# Patient Record
Sex: MALE | Employment: FULL TIME | URBAN - METROPOLITAN AREA
[De-identification: names, ages, dates, MRNs, and addresses within clinical notes are randomized per-mention and may not be internally consistent; named-entity substitution may affect disease eponyms.]

---

## 2017-01-10 DIAGNOSIS — F90.9 ATTENTION DEFICIT HYPERACTIVITY DISORDER (ADHD), UNSPECIFIED ADHD TYPE: Primary | ICD-10-CM

## 2017-01-10 RX ORDER — LISDEXAMFETAMINE DIMESYLATE 40 MG/1
40 CAPSULE ORAL EVERY MORNING
Qty: 30 CAPSULE | Refills: 0 | Status: SHIPPED | OUTPATIENT
Start: 2017-01-10 | End: 2017-02-13

## 2017-02-13 ENCOUNTER — TELEPHONE (OUTPATIENT)
Dept: FAMILY MEDICINE | Facility: CLINIC | Age: 27
End: 2017-02-13

## 2017-02-13 DIAGNOSIS — F90.9 ATTENTION DEFICIT HYPERACTIVITY DISORDER (ADHD), UNSPECIFIED ADHD TYPE: ICD-10-CM

## 2017-02-13 NOTE — TELEPHONE ENCOUNTER
Clinic Action Needed: Patient  requesting hard copy Rx for Vyvanse , please take to your pharmacy downstairs and call Patient  when sent or if questions.   Reason for Call:needs refill .   Routed to:Sent to PCP's nurse pool.   Nithya Hardy RN, [facility]

## 2017-02-14 RX ORDER — LISDEXAMFETAMINE DIMESYLATE 40 MG/1
40 CAPSULE ORAL EVERY MORNING
Qty: 30 CAPSULE | Refills: 0 | Status: SHIPPED | OUTPATIENT
Start: 2017-02-14 | End: 2017-03-14

## 2017-03-14 DIAGNOSIS — F90.9 ATTENTION DEFICIT HYPERACTIVITY DISORDER (ADHD), UNSPECIFIED ADHD TYPE: ICD-10-CM

## 2017-03-14 RX ORDER — LISDEXAMFETAMINE DIMESYLATE 40 MG/1
40 CAPSULE ORAL EVERY MORNING
Qty: 30 CAPSULE | Refills: 0 | Status: SHIPPED | OUTPATIENT
Start: 2017-03-14 | End: 2017-04-13

## 2017-04-13 DIAGNOSIS — F90.9 ATTENTION DEFICIT HYPERACTIVITY DISORDER (ADHD), UNSPECIFIED ADHD TYPE: ICD-10-CM

## 2017-04-13 RX ORDER — LISDEXAMFETAMINE DIMESYLATE 40 MG/1
40 CAPSULE ORAL EVERY MORNING
Qty: 30 CAPSULE | Refills: 0 | Status: SHIPPED | OUTPATIENT
Start: 2017-04-13 | End: 2017-05-11

## 2017-05-11 DIAGNOSIS — F90.9 ATTENTION DEFICIT HYPERACTIVITY DISORDER (ADHD), UNSPECIFIED ADHD TYPE: ICD-10-CM

## 2017-05-11 RX ORDER — LISDEXAMFETAMINE DIMESYLATE 40 MG/1
40 CAPSULE ORAL EVERY MORNING
Qty: 30 CAPSULE | Refills: 0 | Status: SHIPPED | OUTPATIENT
Start: 2017-05-11 | End: 2017-06-13

## 2017-06-13 DIAGNOSIS — F90.9 ATTENTION DEFICIT HYPERACTIVITY DISORDER (ADHD), UNSPECIFIED ADHD TYPE: ICD-10-CM

## 2017-06-13 RX ORDER — LISDEXAMFETAMINE DIMESYLATE 40 MG/1
40 CAPSULE ORAL EVERY MORNING
Qty: 30 CAPSULE | Refills: 0 | Status: SHIPPED | OUTPATIENT
Start: 2017-06-13 | End: 2017-07-13

## 2017-07-13 DIAGNOSIS — F90.9 ATTENTION DEFICIT HYPERACTIVITY DISORDER (ADHD), UNSPECIFIED ADHD TYPE: ICD-10-CM

## 2017-07-13 RX ORDER — LISDEXAMFETAMINE DIMESYLATE 40 MG/1
40 CAPSULE ORAL EVERY MORNING
Qty: 30 CAPSULE | Refills: 0 | Status: SHIPPED | OUTPATIENT
Start: 2017-07-13 | End: 2017-08-10

## 2017-07-13 NOTE — TELEPHONE ENCOUNTER
Rx taken to the [facility] pharmacy per pt request.  Adriana Ackerman RN

## 2017-08-10 DIAGNOSIS — F90.9 ATTENTION DEFICIT HYPERACTIVITY DISORDER (ADHD), UNSPECIFIED ADHD TYPE: ICD-10-CM

## 2017-08-10 RX ORDER — LISDEXAMFETAMINE DIMESYLATE 40 MG/1
40 CAPSULE ORAL EVERY MORNING
Qty: 30 CAPSULE | Refills: 0 | Status: SHIPPED | OUTPATIENT
Start: 2017-08-10 | End: 2017-09-14

## 2017-09-14 ENCOUNTER — OFFICE VISIT (OUTPATIENT)
Dept: INTERNAL MEDICINE | Facility: CLINIC | Age: 27
End: 2017-09-14

## 2017-09-14 VITALS
WEIGHT: 239.4 LBS | HEART RATE: 102 BPM | BODY MASS INDEX: 33.16 KG/M2 | DIASTOLIC BLOOD PRESSURE: 83 MMHG | SYSTOLIC BLOOD PRESSURE: 129 MMHG

## 2017-09-14 DIAGNOSIS — F90.9 ATTENTION DEFICIT HYPERACTIVITY DISORDER (ADHD), UNSPECIFIED ADHD TYPE: ICD-10-CM

## 2017-09-14 RX ORDER — LISDEXAMFETAMINE DIMESYLATE 40 MG/1
40 CAPSULE ORAL EVERY MORNING
Qty: 30 CAPSULE | Refills: 0 | Status: SHIPPED | OUTPATIENT
Start: 2017-09-14 | End: 2017-09-14

## 2017-09-14 RX ORDER — LISDEXAMFETAMINE DIMESYLATE 40 MG/1
40 CAPSULE ORAL EVERY MORNING
Qty: 30 CAPSULE | Refills: 0 | Status: SHIPPED | OUTPATIENT
Start: 2017-10-13 | End: 2017-12-19

## 2017-09-14 RX ORDER — LISDEXAMFETAMINE DIMESYLATE 40 MG/1
40 CAPSULE ORAL EVERY MORNING
Qty: 30 CAPSULE | Refills: 0 | Status: SHIPPED | OUTPATIENT
Start: 2017-09-15 | End: 2017-09-14

## 2017-09-14 RX ORDER — LISDEXAMFETAMINE DIMESYLATE 40 MG/1
40 CAPSULE ORAL EVERY MORNING
Qty: 30 CAPSULE | Refills: 0 | Status: SHIPPED | OUTPATIENT
Start: 2017-11-12 | End: 2017-12-19

## 2017-09-14 ASSESSMENT — PAIN SCALES - GENERAL: PAINLEVEL: NO PAIN (0)

## 2017-09-14 NOTE — PROGRESS NOTES
HPI: Mason Allen is a 27 year old male who comes in for forearm med review visit and refill of his Vyvanse He has been using a 40 mg tablets as since it was originally prescribed on 10/26/2015 by Dr. Mcpherson.this dose has been adequate for him although he thinks it's not quite as much of a stimulant as it was when he originally started taking it. He tries to take it at the same time every day so that his sleep is regulated. He thinks he is sleeping better on this medication than prior to taking it.. His work performance has been very satisfactory and he has received a promotion at his job as a .  He also wanted to tell me that  his dog was diagnosed with round worms which evidently can be transmitted to humans.  Patient states he hasn't had any symptoms and does not think that there is prominent opportunity for transmission.     Is asthma has been stable and he hasn't required use of his albuterol inhaler.    He has stopped running because he got a puppy and it needs to be walked daily. He has gained approximately 10 pounds. He plans to try to lose this by the end of the year.      Patient Active Problem List   Diagnosis     Attention deficit disorder with hyperactivity(314.01)     JONATHON (generalized anxiety disorder)     Seasonal allergic rhinitis         Current Outpatient Prescriptions   Medication Sig Dispense Refill     lisdexamfetamine (VYVANSE) 40 MG capsule Take 1 capsule (40 mg) by mouth every morning Pt must see primary for further refill. 30 capsule 0     albuterol (PROAIR HFA, PROVENTIL HFA, VENTOLIN HFA) 108 (90 BASE) MCG/ACT inhaler Inhale 2 puffs into the lungs daily as needed for shortness of breath / dyspnea or wheezing (use prior to exercise) 3 Inhaler 3     Ibuprofen (ADVIL PO) Take 200 mg by mouth as needed for moderate pain       NONFORMULARY Take 2 capsules by mouth daily           ALLERGIES: Bees    PAST MEDICAL HX: No past medical history on file.    PAST SURGICAL HX: No past  surgical history on file.    FAMILY HX:  No family history on file.    IMMUNIZATION HX:   Immunization History   Administered Date(s) Administered     DTAP (<7y) 1990, 1990, 1991, 08/11/1992, 07/19/1996     HEPA 08/20/2009     HIB 1991, 03/19/2004     HepB 07/14/2003, 08/18/2003, 03/19/2004     MMR 05/19/1992, 07/14/2003     Meningococcal (Menomune ) 03/29/2007     Poliovirus, inactivated (IPV) 1991, 1991, 08/11/1992, 07/19/1996     TDAP Vaccine (Boostrix) 07/05/2016     Tdap (Adacel,Boostrix) 07/14/2003       SOCIAL HX:   Social History     Social History Narrative    .  Works in [employer].        ROS:   CONSTITUTIONAL: no fatigue, no unexpected change in weight  SKIN: no worrisome rashes, no worrisome moles, no worrisome lesions  EYES: no acute vision problems or changes  ENT: no ear problems, no mouth problems, no throat problems  RESP: no significant cough, no shortness of breath  CV: no chest pain, no palpitations, no new or worsening peripheral edema  GI: no nausea, no vomiting, no constipation, no diarrhea  MS: no claudication, no myalgias, no joint aches  ENDOCRINE: no temperature intolerance, no skin/hair changes    OBJECTIVE:  /83  Pulse 102  Wt 108.6 kg (239 lb 6.4 oz)  BMI 33.16 kg/m2   Wt Readings from Last 1 Encounters:   09/14/17 108.6 kg (239 lb 6.4 oz)     Constitutional: no distress, comfortable, pleasant   Eyes: anicteric  Cardiovascular: regular rate and rhythm, normal S1 and S2, no murmurs, rubs or gallops, peripheral pulses full and symmetric   Respiratory:normal breath sounds   Musculoskeletal: full range of motion, no edema   Skin: no concerning lesions, no jaundice, temp normal   Neurological: normal gait,normal speech, no tremor   Psychological: appropriate mood   LYMPH: no axillary, cervical,  supraclavicular, infraclavicular or inguinal nodes.    ASSESSMENT/PLAN:  Mason was seen today for refill request.    Diagnoses and all orders  for this visit:    Attention deficit hyperactivity disorder (ADHD), unspecified ADHD type  He was given 3 one-month prescriptions for Vyvanse.  He will call clinic and speak with the nurse when he needs 3 more prescriptions.     He is to call if he has any he is to call if he has any GI issues or if he wants to submit a stool specimen to look for roundworms.      Total time spent 25 minutes.  More than 50% of the time spent with Mr. Allen on counseling / coordinating his care    Kelly ALSTON CNP

## 2017-09-14 NOTE — MR AVS SNAPSHOT
After Visit Summary   2017    Mason Allen    MRN: [id number]           Patient Information     Date Of Birth          1990        Visit Information        Provider Department      2017 10:05 AM Kelly Lal, APRN CNP [facility]        Today's Diagnoses     Attention deficit hyperactivity disorder (ADHD), unspecified ADHD type           Follow-ups after your visit        Who to contact     Please call your clinic at [phone] to:    Ask questions about your health    Make or cancel appointments    Discuss your medicines    Learn about your test results    Speak to your doctor   If you have compliments or concerns about an experience at your clinic, or if you wish to file a complaint, please contact [facility] Patient Relations at [phone] or email us at [email or web address]         Additional Information About Your Visit        MyChart Information     Kickfiret is an electronic gateway that provides easy, online access to your medical records. With [facility] [place], you can request a clinic appointment, read your test results, renew a prescription or communicate with your care team.     To sign up for Kickfiret visit the website at [email or web address]   You will be asked to enter the access code listed below, as well as some personal information. Please follow the directions to create your username and password.     Your access code is: [email or web address]  Expires: 2017  6:30 AM     Your access code will  in 90 days. If you need help or a new code, please contact your [facility] or call [phone] for assistance.        Care EveryWhere ID     This is your Care EveryWhere ID. This could be used by other organizations to access your [facility] medical records  [id number]        Your Vitals Were     Pulse BMI (Body Mass Index)                102 33.16 kg/m2           Blood Pressure from Last 3  Encounters:   09/14/17 129/83   07/05/16 149/85   04/13/16 126/69    Weight from Last 3 Encounters:   09/14/17 108.6 kg (239 lb 6.4 oz)   07/05/16 106.1 kg (234 lb)   04/13/16 107.5 kg (237 lb)              Today, you had the following     No orders found for display         Today's Medication Changes          These changes are accurate as of: 9/14/17  1:34 PM.  If you have any questions, ask your nurse or doctor.               Start taking these medicines.        Dose/Directions    * lisdexamfetamine 40 MG capsule   Commonly known as:  VYVANSE   Used for:  Attention deficit hyperactivity disorder (ADHD), unspecified ADHD type   Started by:  Kelly Lal APRN CNP        Dose:  40 mg   Start taking on:  10/13/2017   Take 1 capsule (40 mg) by mouth every morning Pt must see primary for further refill.   Quantity:  30 capsule   Refills:  0       * lisdexamfetamine 40 MG capsule   Commonly known as:  VYVANSE   Used for:  Attention deficit hyperactivity disorder (ADHD), unspecified ADHD type   Started by:  Kelly Lal APRN CNP        Dose:  40 mg   Start taking on:  11/12/2017   Take 1 capsule (40 mg) by mouth every morning Pt must see primary for further refill.   Quantity:  30 capsule   Refills:  0       * Notice:  This list has 2 medication(s) that are the same as other medications prescribed for you. Read the directions carefully, and ask your doctor or other care provider to review them with you.         Where to get your medicines      Some of these will need a paper prescription and others can be bought over the counter.  Ask your nurse if you have questions.     Bring a paper prescription for each of these medications     lisdexamfetamine 40 MG capsule    lisdexamfetamine 40 MG capsule                Primary Care Provider Office Phone # Fax #    GAMALIEL Snow [phone]       [address] [place] [address] [address]  [facility] [id number]        Equal Access to Services     ADILSON GLEZ  AH: Hadii linda cliftonmaldonadoo Soelaineali, waaxda luqadaha, qaybta kaaljudy aparicio, kimani orestesin hayaanora franksbon villavicencio kat scanlon. So [facility] [phone].    ATENCIÓN: Si mitchellla vera, tiene a beck disposición servicios gratuitos de asistencia lingüística. Llame al [phone].    We comply with applicable federal civil rights laws and [place] laws. We do not discriminate on the basis of race, color, national origin, age, disability sex, sexual orientation or gender identity.            Thank you!     Thank you for choosing [facility]  for your care. Our goal is always to provide you with excellent care. Hearing back from our patients is one way we can continue to improve our services. Please take a few minutes to complete the written survey that you may receive in the mail after your visit with us. Thank you!             Your Updated Medication List - Protect others around you: Learn how to safely use, store and throw away your medicines at www.disposemymeds.org.          This list is accurate as of: 9/14/17  1:34 PM.  Always use your most recent med list.                   Brand Name Dispense Instructions for use Diagnosis    ADVIL PO      Take 200 mg by mouth as needed for moderate pain        albuterol 108 (90 BASE) MCG/ACT Inhaler    PROAIR HFA/PROVENTIL HFA/VENTOLIN HFA    3 Inhaler    Inhale 2 puffs into the lungs daily as needed for shortness of breath / dyspnea or wheezing (use prior to exercise)    Exercise-induced asthma       * lisdexamfetamine 40 MG capsule   Start taking on:  10/13/2017    VYVANSE    30 capsule    Take 1 capsule (40 mg) by mouth every morning Pt must see primary for further refill.    Attention deficit hyperactivity disorder (ADHD), unspecified ADHD type       * lisdexamfetamine 40 MG capsule   Start taking on:  11/12/2017    VYVANSE    30 capsule    Take 1 capsule (40 mg) by mouth every morning Pt must see primary for further refill.    Attention deficit hyperactivity  disorder (ADHD), unspecified ADHD type       NONFORMULARY      Take 2 capsules by mouth daily        * Notice:  This list has 2 medication(s) that are the same as other medications prescribed for you. Read the directions carefully, and ask your doctor or other care provider to review them with you.

## 2017-09-14 NOTE — NURSING NOTE
Chief Complaint   Patient presents with     Refill Request     Patient here for a medication refill request.     Swati Murray LPN at 10:03 AM on 9/14/2017.

## 2017-12-19 DIAGNOSIS — F90.9 ATTENTION DEFICIT HYPERACTIVITY DISORDER (ADHD), UNSPECIFIED ADHD TYPE: ICD-10-CM

## 2017-12-19 RX ORDER — LISDEXAMFETAMINE DIMESYLATE 40 MG/1
40 CAPSULE ORAL EVERY MORNING
Qty: 30 CAPSULE | Refills: 0 | Status: SHIPPED | OUTPATIENT
Start: 2017-12-19 | End: 2017-12-19

## 2017-12-19 RX ORDER — LISDEXAMFETAMINE DIMESYLATE 40 MG/1
40 CAPSULE ORAL EVERY MORNING
Qty: 30 CAPSULE | Refills: 0 | Status: SHIPPED | OUTPATIENT
Start: 2019-01-19 | End: 2018-04-26

## 2017-12-19 RX ORDER — LISDEXAMFETAMINE DIMESYLATE 40 MG/1
40 CAPSULE ORAL EVERY MORNING
Qty: 30 CAPSULE | Refills: 0 | Status: SHIPPED | OUTPATIENT
Start: 2019-02-19 | End: 2017-12-19

## 2017-12-19 NOTE — TELEPHONE ENCOUNTER
Rx for the month of December, January, and February mailed to pt's home address.  Next refill is due on 3/19/17  Message left for pt.

## 2018-04-25 DIAGNOSIS — F90.9 ATTENTION DEFICIT HYPERACTIVITY DISORDER (ADHD), UNSPECIFIED ADHD TYPE: ICD-10-CM

## 2018-04-25 NOTE — TELEPHONE ENCOUNTER
Patient calling for a refill of the following medication:    lisdexamfetamine (VYVANSE) 40 MG capsule    Please call him when approved

## 2018-04-26 RX ORDER — LISDEXAMFETAMINE DIMESYLATE 40 MG/1
40 CAPSULE ORAL EVERY MORNING
Qty: 30 CAPSULE | Refills: 0 | Status: SHIPPED | OUTPATIENT
Start: 2019-01-19 | End: 2018-04-27

## 2018-04-27 RX ORDER — LISDEXAMFETAMINE DIMESYLATE 40 MG/1
40 CAPSULE ORAL EVERY MORNING
Qty: 30 CAPSULE | Refills: 0
Start: 2018-04-26 | End: 2018-06-07

## 2018-06-06 DIAGNOSIS — F90.9 ATTENTION DEFICIT HYPERACTIVITY DISORDER (ADHD), UNSPECIFIED ADHD TYPE: ICD-10-CM

## 2018-06-06 NOTE — TELEPHONE ENCOUNTER
[facility] Call Center    Phone Message    May a detailed message be left on voicemail: yes    Reason for Call: Medication Refill Request    Has the patient contacted the pharmacy for the refill? Yes   Name of medication being requested: lisdexamfetamine (VYVANSE) 40 MG capsule  Provider who prescribed the medication: Kelly Lal  Pharmacy: N/A = controlled substance  Date medication is needed: asap - Pt is almost out - please call Pt when medication Rx paper ready to be picked up in the lock box - Thanks!    Action Taken: Message routed to:  [facility] Primary Care

## 2018-06-06 NOTE — TELEPHONE ENCOUNTER
[facility] Call Center    Phone Message    May a detailed message be left on voicemail: yes    Reason for Call: Other: Re-routing to P [facility] MED REFILLS TEAM     Action Taken: Message routed to:  [facility]): P [facility] MED REFILLS TEAM

## 2018-06-07 DIAGNOSIS — F90.9 ATTENTION DEFICIT HYPERACTIVITY DISORDER (ADHD), UNSPECIFIED ADHD TYPE: ICD-10-CM

## 2018-06-07 RX ORDER — LISDEXAMFETAMINE DIMESYLATE 40 MG/1
40 CAPSULE ORAL EVERY MORNING
Qty: 30 CAPSULE | Refills: 0 | Status: CANCELLED | OUTPATIENT
Start: 2018-06-07

## 2018-06-11 NOTE — TELEPHONE ENCOUNTER
[facility] Call Center    Phone Message    May a detailed message be left on voicemail: yes    Reason for Call: Other: PT following up on his Rx.  Please call him with the status.     Action Taken: Message routed to:  Other: Primary Care

## 2018-06-12 RX ORDER — LISDEXAMFETAMINE DIMESYLATE 40 MG/1
40 CAPSULE ORAL EVERY MORNING
Qty: 30 CAPSULE | Refills: 0 | Status: SHIPPED | OUTPATIENT
Start: 2018-06-12 | End: 2018-07-31

## 2018-07-27 DIAGNOSIS — F90.9 ATTENTION DEFICIT HYPERACTIVITY DISORDER (ADHD), UNSPECIFIED ADHD TYPE: ICD-10-CM

## 2018-07-27 NOTE — TELEPHONE ENCOUNTER
Controlled substance refill takes 5-7 business days.    Will send it to Kelly when she is back to the clinic on 7/31.

## 2018-07-27 NOTE — TELEPHONE ENCOUNTER
Health Call Center    Phone Message    May a detailed message be left on voicemail: yes    Reason for Call: Medication Refill Request    Has the patient contacted the pharmacy for the refill? Yes   Name of medication being requested: lisdexamfetamine (VYVANSE) 40 MG capsule  Provider who prescribed the medication: Kelly Lal  Pharmacy: Printed  Date medication is needed: Soon as possible - Pt stated he get 3 scripts mailed at a time and 2 are post-dated      Action Taken: Message routed to:  [facility]

## 2018-07-31 RX ORDER — LISDEXAMFETAMINE DIMESYLATE 40 MG/1
40 CAPSULE ORAL EVERY MORNING
Qty: 30 CAPSULE | Refills: 0 | Status: SHIPPED | OUTPATIENT
Start: 2018-07-31 | End: 2018-08-01

## 2018-08-01 RX ORDER — LISDEXAMFETAMINE DIMESYLATE 40 MG/1
40 CAPSULE ORAL EVERY MORNING
Qty: 30 CAPSULE | Refills: 0 | Status: SHIPPED | OUTPATIENT
Start: 2018-09-29 | End: 2020-03-27

## 2018-08-01 RX ORDER — LISDEXAMFETAMINE DIMESYLATE 40 MG/1
40 CAPSULE ORAL EVERY MORNING
Qty: 30 CAPSULE | Refills: 0 | Status: SHIPPED | OUTPATIENT
Start: 2018-08-30 | End: 2018-08-01

## 2019-08-27 ENCOUNTER — OFFICE VISIT (OUTPATIENT)
Dept: URGENT CARE | Facility: URGENT CARE | Age: 29
End: 2019-08-27
Payer: COMMERCIAL

## 2019-08-27 ENCOUNTER — NURSE TRIAGE (OUTPATIENT)
Dept: FAMILY MEDICINE | Facility: CLINIC | Age: 29
End: 2019-08-27

## 2019-08-27 VITALS
HEIGHT: 70 IN | WEIGHT: 251.6 LBS | BODY MASS INDEX: 36.02 KG/M2 | OXYGEN SATURATION: 99 % | HEART RATE: 87 BPM | DIASTOLIC BLOOD PRESSURE: 82 MMHG | TEMPERATURE: 98.7 F | SYSTOLIC BLOOD PRESSURE: 126 MMHG

## 2019-08-27 DIAGNOSIS — R10.13 ABDOMINAL PAIN, EPIGASTRIC: Primary | ICD-10-CM

## 2019-08-27 DIAGNOSIS — R10.11 RUQ ABDOMINAL PAIN: ICD-10-CM

## 2019-08-27 DIAGNOSIS — R19.7 DIARRHEA, UNSPECIFIED TYPE: ICD-10-CM

## 2019-08-27 DIAGNOSIS — R79.89 LFT ELEVATION: ICD-10-CM

## 2019-08-27 DIAGNOSIS — K92.1 BLOOD IN STOOL: ICD-10-CM

## 2019-08-27 LAB
ALBUMIN SERPL-MCNC: 4.3 G/DL (ref 3.4–5)
ALP SERPL-CCNC: 66 U/L (ref 40–150)
ALT SERPL W P-5'-P-CCNC: 131 U/L (ref 0–70)
AMYLASE SERPL-CCNC: 20 U/L (ref 30–110)
ANION GAP SERPL CALCULATED.3IONS-SCNC: 5 MMOL/L (ref 3–14)
AST SERPL W P-5'-P-CCNC: 46 U/L (ref 0–45)
BASOPHILS # BLD AUTO: 0 10E9/L (ref 0–0.2)
BASOPHILS NFR BLD AUTO: 0.6 %
BILIRUB SERPL-MCNC: 0.6 MG/DL (ref 0.2–1.3)
BUN SERPL-MCNC: 8 MG/DL (ref 7–30)
CALCIUM SERPL-MCNC: 9.3 MG/DL (ref 8.5–10.1)
CHLORIDE SERPL-SCNC: 105 MMOL/L (ref 94–109)
CO2 SERPL-SCNC: 30 MMOL/L (ref 20–32)
CREAT SERPL-MCNC: 0.86 MG/DL (ref 0.66–1.25)
DIFFERENTIAL METHOD BLD: NORMAL
EOSINOPHIL # BLD AUTO: 0.1 10E9/L (ref 0–0.7)
EOSINOPHIL NFR BLD AUTO: 1.9 %
ERYTHROCYTE [DISTWIDTH] IN BLOOD BY AUTOMATED COUNT: 12.6 % (ref 10–15)
ERYTHROCYTE [SEDIMENTATION RATE] IN BLOOD BY WESTERGREN METHOD: 5 MM/H (ref 0–15)
GFR SERPL CREATININE-BSD FRML MDRD: >90 ML/MIN/{1.73_M2}
GLUCOSE SERPL-MCNC: 97 MG/DL (ref 70–99)
HCT VFR BLD AUTO: 43.9 % (ref 40–53)
HGB BLD-MCNC: 14.7 G/DL (ref 13.3–17.7)
LIPASE SERPL-CCNC: 106 U/L (ref 73–393)
LYMPHOCYTES # BLD AUTO: 2.2 10E9/L (ref 0.8–5.3)
LYMPHOCYTES NFR BLD AUTO: 34.3 %
MCH RBC QN AUTO: 28.8 PG (ref 26.5–33)
MCHC RBC AUTO-ENTMCNC: 33.5 G/DL (ref 31.5–36.5)
MCV RBC AUTO: 86 FL (ref 78–100)
MONOCYTES # BLD AUTO: 0.6 10E9/L (ref 0–1.3)
MONOCYTES NFR BLD AUTO: 10 %
NEUTROPHILS # BLD AUTO: 3.4 10E9/L (ref 1.6–8.3)
NEUTROPHILS NFR BLD AUTO: 53.2 %
PLATELET # BLD AUTO: 256 10E9/L (ref 150–450)
POTASSIUM SERPL-SCNC: 4.4 MMOL/L (ref 3.4–5.3)
PROT SERPL-MCNC: 7.9 G/DL (ref 6.8–8.8)
RBC # BLD AUTO: 5.11 10E12/L (ref 4.4–5.9)
SODIUM SERPL-SCNC: 140 MMOL/L (ref 133–144)
WBC # BLD AUTO: 6.4 10E9/L (ref 4–11)

## 2019-08-27 PROCEDURE — 83690 ASSAY OF LIPASE: CPT | Performed by: FAMILY MEDICINE

## 2019-08-27 PROCEDURE — 99214 OFFICE O/P EST MOD 30 MIN: CPT | Performed by: FAMILY MEDICINE

## 2019-08-27 PROCEDURE — 85025 COMPLETE CBC W/AUTO DIFF WBC: CPT | Performed by: FAMILY MEDICINE

## 2019-08-27 PROCEDURE — 36415 COLL VENOUS BLD VENIPUNCTURE: CPT | Performed by: FAMILY MEDICINE

## 2019-08-27 PROCEDURE — 80053 COMPREHEN METABOLIC PANEL: CPT | Performed by: FAMILY MEDICINE

## 2019-08-27 PROCEDURE — 82150 ASSAY OF AMYLASE: CPT | Performed by: FAMILY MEDICINE

## 2019-08-27 PROCEDURE — 85652 RBC SED RATE AUTOMATED: CPT | Performed by: FAMILY MEDICINE

## 2019-08-27 ASSESSMENT — MIFFLIN-ST. JEOR: SCORE: 2117.5

## 2019-08-27 NOTE — TELEPHONE ENCOUNTER
"Triaged per Protocol and provided advise. Pt plans to follow advise, will be evaluated in UC.     Annette LOPEZ RN      Reason for Disposition    MODERATE rectal bleeding (small blood clots, passing blood without stool, or toilet water turns red) more than once a day    Bloody, black, or tarry bowel movements    Additional Information    Negative: Passed out (i.e., fainted, collapsed and was not responding)    Negative: Shock suspected (e.g., cold/pale/clammy skin, too weak to stand, low BP, rapid pulse)    Negative: Vomiting red blood or black (coffee ground) material    Negative: Sounds like a life-threatening emergency to the triager    Negative: Diarrhea is the main symptom    Negative: Rectal symptoms    Negative: SEVERE abdominal pain (e.g., excruciating)    Negative: Constant abdominal pain lasting > 2 hours    MODERATE rectal bleeding (small blood clots, passing blood without stool, or toilet water turns red)    Negative: Taking Coumadin (warfarin) or other strong blood thinner, or known bleeding disorder (e.g., thrombocytopenia)    Negative: Colonoscopy in past 72 hours    Negative: Known cirrhosis of the liver (or history of liver failure or ascites)    Patient wants to be seen    Negative: High-risk adult (e.g., prior surgery on aorta, abdominal aortic aneurysm)    Negative: Rectal foreign body (inserted or swallowed)    Negative: Pale skin (pallor) of new onset or worsening    Negative: Patient sounds very sick or weak to the triager    Negative: MILD rectal bleeding (more than just a few drops or streaks)    Negative: Cancer of rectum or intestines (colon)    Negative: Radiation therapy to lower abdomen or pelvis    Negative: Normal formed BM with a few streaks or drops of blood on surface of BM    Negative: Rectal bleeding is minimal (e.g., blood just on toilet paper, a few drops in toilet bowl)    Answer Assessment - Initial Assessment Questions  1. APPEARANCE of BLOOD: \"What color is it?\" \"Is it passed " "separately, on the surface of the stool, or mixed in with the stool?\"     Toilet was red cool aide color     Water of the toilet was colored       2. AMOUNT: \"How much blood was passed?\"         Unsure     3. FREQUENCY: \"How many times has blood been passed with the stools?\"         Twice     4. ONSET: \"When was the blood first seen in the stools?\" (Days or weeks)            5. DIARRHEA: \"Is there also some diarrhea?\" If so, ask: \"How many diarrhea stools were passed in past 24 hours?\"         Yes, consistently for 1 month or more     6. CONSTIPATION: \"Do you have constipation?\" If so, \"How bad is it?\"         Denies    7. RECURRENT SYMPTOMS: \"Have you had blood in your stools before?\" If so, ask: \"When was the last time?\" and \"What happened that time?\"     Once- 6 months ago--very light colored, \"paniced and then calmed down\"     8. BLOOD THINNERS: \"Do you take any blood thinners?\" (e.g., Coumadin/warfarin, Pradaxa/dabigatran, aspirin)      Denies     9. OTHER SYMPTOMS: \"Do you have any other symptoms?\"  (e.g., abdominal pain, vomiting, dizziness, fever)       Golfing on , could not finished d/t lightheadedness    Abdominal Pain  Onset: Saturday  Location: right above belly button, and under right ribcage   Aggravatin hours after eating      Nausea  Onset:   Aggravating: Rich, fatty foods     Nausea, no vomiting   Denies fever     Denies SOB    Protocols used: RECTAL BLEEDING-A-OH      "

## 2019-08-27 NOTE — TELEPHONE ENCOUNTER
Reason for call:  Patient reporting a symptom    Symptom or request: Blood in stool, abdominal pain (especially after eating)    Duration (how long have symptoms been present): blood in stool has happened twice, abdominal pain for a week    Have you been treated for this before? No    Additional comments: transferred to triage    Phone Number patient can be reached at:  Home number on file [phone] (home)    Best Time:  any    Can we leave a detailed message on this number:  YES    Call taken on 8/27/2019 at 9:03 AM by Lisa Ortez

## 2019-08-27 NOTE — PROGRESS NOTES
"SUBJECTIVE  HPI: Mason Allen is a 29 year old male  who presents with the CC of abdominal/pelvic pain and diarrhea with occasional blood in stool.   Pain is located in the epigastric and RUQ area, with radiation to None    The pain is characterized as cramping.    Pain has been present for 1 month(s) and is fluctuating.     EXACERBATING FACTORS: 2 hrs after eating   RELIEVING FACTORS: NEGATIVE.    ASSOCIATED SX: none.     No past medical history on file.    No past surgical history on file.    No family history on file.    Social History     Tobacco Use     Smoking status: Never Smoker     Smokeless tobacco: Never Used   Substance Use Topics     Alcohol use: Not on file       EXAMINATION:  /82   Pulse 87   Temp 98.7  F (37.1  C) (Oral)   Ht 1.778 m (5' 10\")   Wt 114.1 kg (251 lb 9.6 oz)   SpO2 99%   BMI 36.10 kg/m  GENERAL APPEARANCE: healthy, alert and no distress  ABDOMEN: soft, normal bowel sounds, tenderness mild epigastric and RUQ, no guarding/ridigity rebound      ICD-10-CM    1. Abdominal pain, epigastric R10.13 CBC with platelets differential     Comprehensive metabolic panel     Lipase     Amylase     Erythrocyte sedimentation rate auto     Enteric Bacteria and Virus Panel by NEY Stool     Ova and Parasite Exam Routine     omeprazole (PRILOSEC) 20 MG DR capsule     GASTROENTEROLOGY ADULT REF CONSULT ONLY   2. RUQ abdominal pain R10.11 CBC with platelets differential     Comprehensive metabolic panel     Lipase     Amylase     Erythrocyte sedimentation rate auto     Enteric Bacteria and Virus Panel by NEY Stool     Ova and Parasite Exam Routine     GASTROENTEROLOGY ADULT REF CONSULT ONLY   3. Diarrhea, unspecified type R19.7 CBC with platelets differential     Comprehensive metabolic panel     Lipase     Amylase     Erythrocyte sedimentation rate auto     Enteric Bacteria and Virus Panel by NEY Stool     Ova and Parasite Exam Routine     GASTROENTEROLOGY ADULT REF CONSULT ONLY   4. Blood in stool " K92.1 CBC with platelets differential     Comprehensive metabolic panel     Lipase     Amylase     Erythrocyte sedimentation rate auto     Enteric Bacteria and Virus Panel by NEY Stool     Ova and Parasite Exam Routine     GASTROENTEROLOGY ADULT REF CONSULT ONLY   5. LFT elevation R94.5      F/U PCP/IM/FP, ED if worse

## 2019-08-28 ENCOUNTER — TELEPHONE (OUTPATIENT)
Dept: GASTROENTEROLOGY | Facility: CLINIC | Age: 29
End: 2019-08-28

## 2019-08-28 ENCOUNTER — HOSPITAL ENCOUNTER (OUTPATIENT)
Dept: LAB | Facility: CLINIC | Age: 29
Discharge: HOME | End: 2019-08-28
Admitting: FAMILY MEDICINE
Payer: COMMERCIAL

## 2019-08-28 DIAGNOSIS — R10.11 RUQ ABDOMINAL PAIN: ICD-10-CM

## 2019-08-28 DIAGNOSIS — R19.7 DIARRHEA, UNSPECIFIED TYPE: ICD-10-CM

## 2019-08-28 DIAGNOSIS — R10.13 ABDOMINAL PAIN, EPIGASTRIC: ICD-10-CM

## 2019-08-28 DIAGNOSIS — K92.1 BLOOD IN STOOL: ICD-10-CM

## 2019-08-28 PROCEDURE — 87177 OVA AND PARASITES SMEARS: CPT | Performed by: FAMILY MEDICINE

## 2019-08-28 PROCEDURE — 87506 IADNA-DNA/RNA PROBE TQ 6-11: CPT | Performed by: FAMILY MEDICINE

## 2019-08-28 PROCEDURE — 87209 SMEAR COMPLEX STAIN: CPT | Performed by: FAMILY MEDICINE

## 2019-08-28 NOTE — TELEPHONE ENCOUNTER
Health Call Center    Phone Message    May a detailed message be left on voicemail: yes    Reason for Call: Other: Pt has a referral in [facility] per Pt's urgent care visit on 8/27/2019. Pt noted that he needs to be seen soon for the follow up appt. Clinic notes and referral are in Pt's chart. Please follow up with Pt to discuss scheduling options.      Action Taken: Message routed to:  [facility] GI Med

## 2019-08-28 NOTE — TELEPHONE ENCOUNTER
Pt seen in urgent care. Pt was referred to [facility] and also [facility]. Pt has an appt next week with [facility]. Unable to offer pt an appt next week due to high demand and number of providers.

## 2019-08-29 LAB
C COLI+JEJUNI+LARI FUSA STL QL NAA+PROBE: NOT DETECTED
EC STX1 GENE STL QL NAA+PROBE: NOT DETECTED
EC STX2 GENE STL QL NAA+PROBE: NOT DETECTED
ENTERIC PATHOGEN COMMENT: NORMAL
NOROV GI+II ORF1-ORF2 JNC STL QL NAA+PR: NOT DETECTED
O+P STL MICRO: NORMAL
O+P STL MICRO: NORMAL
RVA NSP5 STL QL NAA+PROBE: NOT DETECTED
SALMONELLA SP RPOD STL QL NAA+PROBE: NOT DETECTED
SHIGELLA SP+EIEC IPAH STL QL NAA+PROBE: NOT DETECTED
SPECIMEN SOURCE: NORMAL
V CHOL+PARA RFBL+TRKH+TNAA STL QL NAA+PR: NOT DETECTED
Y ENTERO RECN STL QL NAA+PROBE: NOT DETECTED

## 2019-09-04 ENCOUNTER — MEDICAL CORRESPONDENCE (OUTPATIENT)
Dept: HEALTH INFORMATION MANAGEMENT | Facility: CLINIC | Age: 29
End: 2019-09-04

## 2019-09-18 DIAGNOSIS — R19.7 DIARRHEA: ICD-10-CM

## 2019-09-18 DIAGNOSIS — R10.13 EPIGASTRIC PAIN: Primary | ICD-10-CM

## 2019-09-18 DIAGNOSIS — K92.1 MELENA: ICD-10-CM

## 2019-09-18 DIAGNOSIS — K92.1 HEMATOCHEZIA: ICD-10-CM

## 2019-09-19 ENCOUNTER — HOSPITAL ENCOUNTER (OUTPATIENT)
Dept: LAB | Facility: CLINIC | Age: 29
End: 2019-09-19
Attending: INTERNAL MEDICINE
Payer: COMMERCIAL

## 2019-09-19 ENCOUNTER — HOSPITAL ENCOUNTER (OUTPATIENT)
Dept: ULTRASOUND IMAGING | Facility: CLINIC | Age: 29
Discharge: HOME | End: 2019-09-19
Attending: INTERNAL MEDICINE | Admitting: INTERNAL MEDICINE
Payer: COMMERCIAL

## 2019-09-19 DIAGNOSIS — R10.13 EPIGASTRIC PAIN: ICD-10-CM

## 2019-09-19 DIAGNOSIS — K92.1 HEMATOCHEZIA: ICD-10-CM

## 2019-09-19 DIAGNOSIS — R19.7 DIARRHEA: ICD-10-CM

## 2019-09-19 DIAGNOSIS — K92.1 MELENA: ICD-10-CM

## 2019-09-19 LAB
ALBUMIN SERPL-MCNC: 4.4 G/DL (ref 3.4–5)
ALP SERPL-CCNC: 61 U/L (ref 40–150)
ALT SERPL W P-5'-P-CCNC: 193 U/L (ref 0–70)
ANION GAP SERPL CALCULATED.3IONS-SCNC: 5 MMOL/L (ref 3–14)
AST SERPL W P-5'-P-CCNC: 80 U/L (ref 0–45)
BASOPHILS # BLD AUTO: 0 10E9/L (ref 0–0.2)
BASOPHILS NFR BLD AUTO: 0.5 %
BILIRUB SERPL-MCNC: 0.9 MG/DL (ref 0.2–1.3)
BUN SERPL-MCNC: 10 MG/DL (ref 7–30)
CALCIUM SERPL-MCNC: 9.1 MG/DL (ref 8.5–10.1)
CHLORIDE SERPL-SCNC: 105 MMOL/L (ref 94–109)
CO2 SERPL-SCNC: 26 MMOL/L (ref 20–32)
CREAT SERPL-MCNC: 0.76 MG/DL (ref 0.66–1.25)
CRP SERPL HS-MCNC: 2.7 MG/L
DIFFERENTIAL METHOD BLD: NORMAL
EOSINOPHIL # BLD AUTO: 0.1 10E9/L (ref 0–0.7)
EOSINOPHIL NFR BLD AUTO: 2 %
ERYTHROCYTE [DISTWIDTH] IN BLOOD BY AUTOMATED COUNT: 12.6 % (ref 10–15)
FERRITIN SERPL-MCNC: 241 NG/ML (ref 26–388)
GFR SERPL CREATININE-BSD FRML MDRD: >90 ML/MIN/{1.73_M2}
GLUCOSE SERPL-MCNC: 88 MG/DL (ref 70–99)
HCT VFR BLD AUTO: 44.4 % (ref 40–53)
HGB BLD-MCNC: 14.7 G/DL (ref 13.3–17.7)
IMM GRANULOCYTES # BLD: 0 10E9/L (ref 0–0.4)
IMM GRANULOCYTES NFR BLD: 0.4 %
IRON SATN MFR SERPL: 22 % (ref 15–46)
IRON SERPL-MCNC: 91 UG/DL (ref 35–180)
LYMPHOCYTES # BLD AUTO: 1.7 10E9/L (ref 0.8–5.3)
LYMPHOCYTES NFR BLD AUTO: 31.1 %
MCH RBC QN AUTO: 28.9 PG (ref 26.5–33)
MCHC RBC AUTO-ENTMCNC: 33.1 G/DL (ref 31.5–36.5)
MCV RBC AUTO: 87 FL (ref 78–100)
MONOCYTES # BLD AUTO: 0.5 10E9/L (ref 0–1.3)
MONOCYTES NFR BLD AUTO: 9.5 %
NEUTROPHILS # BLD AUTO: 3.1 10E9/L (ref 1.6–8.3)
NEUTROPHILS NFR BLD AUTO: 56.5 %
NRBC # BLD AUTO: 0 10*3/UL
NRBC BLD AUTO-RTO: 0 /100
PLATELET # BLD AUTO: 208 10E9/L (ref 150–450)
POTASSIUM SERPL-SCNC: 3.8 MMOL/L (ref 3.4–5.3)
PROT SERPL-MCNC: 7.8 G/DL (ref 6.8–8.8)
RBC # BLD AUTO: 5.09 10E12/L (ref 4.4–5.9)
SODIUM SERPL-SCNC: 136 MMOL/L (ref 133–144)
TIBC SERPL-MCNC: 405 UG/DL (ref 240–430)
WBC # BLD AUTO: 5.5 10E9/L (ref 4–11)

## 2019-09-19 PROCEDURE — 36415 COLL VENOUS BLD VENIPUNCTURE: CPT | Performed by: INTERNAL MEDICINE

## 2019-09-19 PROCEDURE — 76700 US EXAM ABDOM COMPLETE: CPT

## 2019-09-19 PROCEDURE — 83540 ASSAY OF IRON: CPT | Performed by: INTERNAL MEDICINE

## 2019-09-19 PROCEDURE — 83516 IMMUNOASSAY NONANTIBODY: CPT | Performed by: INTERNAL MEDICINE

## 2019-09-19 PROCEDURE — 86256 FLUORESCENT ANTIBODY TITER: CPT | Performed by: INTERNAL MEDICINE

## 2019-09-19 PROCEDURE — 82784 ASSAY IGA/IGD/IGG/IGM EACH: CPT | Performed by: INTERNAL MEDICINE

## 2019-09-19 PROCEDURE — 83550 IRON BINDING TEST: CPT | Performed by: INTERNAL MEDICINE

## 2019-09-19 PROCEDURE — 82728 ASSAY OF FERRITIN: CPT | Performed by: INTERNAL MEDICINE

## 2019-09-19 PROCEDURE — 86706 HEP B SURFACE ANTIBODY: CPT | Performed by: INTERNAL MEDICINE

## 2019-09-19 PROCEDURE — 83516 IMMUNOASSAY NONANTIBODY: CPT | Mod: 91 | Performed by: INTERNAL MEDICINE

## 2019-09-19 PROCEDURE — 85025 COMPLETE CBC W/AUTO DIFF WBC: CPT | Performed by: INTERNAL MEDICINE

## 2019-09-19 PROCEDURE — 80053 COMPREHEN METABOLIC PANEL: CPT | Performed by: INTERNAL MEDICINE

## 2019-09-19 PROCEDURE — 87340 HEPATITIS B SURFACE AG IA: CPT | Performed by: INTERNAL MEDICINE

## 2019-09-19 PROCEDURE — 86141 C-REACTIVE PROTEIN HS: CPT | Performed by: INTERNAL MEDICINE

## 2019-09-19 PROCEDURE — 86803 HEPATITIS C AB TEST: CPT | Performed by: INTERNAL MEDICINE

## 2019-09-19 PROCEDURE — 86038 ANTINUCLEAR ANTIBODIES: CPT | Performed by: INTERNAL MEDICINE

## 2019-09-20 LAB
ANA SER QL IF: NEGATIVE
GLIADIN IGA SER-ACNC: <1 U/ML
GLIADIN IGG SER-ACNC: <1 U/ML
HBV SURFACE AB SERPL IA-ACNC: 930.15 M[IU]/ML
HBV SURFACE AG SERPL QL IA: NONREACTIVE
HCV AB SERPL QL IA: NONREACTIVE
IGA SERPL-MCNC: 92 MG/DL (ref 70–380)
SMA IGG SER-ACNC: 3 UNITS (ref 0–19)
TTG IGA SER-ACNC: <1 U/ML
TTG IGG SER-ACNC: <1 U/ML

## 2019-09-21 LAB — ENDOMYSIUM IGA TITR SER IF: NORMAL {TITER}

## 2019-10-03 ENCOUNTER — OFFICE VISIT (OUTPATIENT)
Dept: FAMILY MEDICINE | Facility: CLINIC | Age: 29
End: 2019-10-03
Payer: COMMERCIAL

## 2019-10-03 VITALS
HEART RATE: 96 BPM | HEIGHT: 70 IN | OXYGEN SATURATION: 98 % | TEMPERATURE: 97.9 F | SYSTOLIC BLOOD PRESSURE: 119 MMHG | DIASTOLIC BLOOD PRESSURE: 81 MMHG | BODY MASS INDEX: 35.5 KG/M2 | WEIGHT: 248 LBS

## 2019-10-03 DIAGNOSIS — Z83.79 FAMILY HISTORY OF CROHN'S DISEASE: ICD-10-CM

## 2019-10-03 DIAGNOSIS — R06.83 SNORING: ICD-10-CM

## 2019-10-03 DIAGNOSIS — Z76.89 ESTABLISHING CARE WITH NEW DOCTOR, ENCOUNTER FOR: ICD-10-CM

## 2019-10-03 DIAGNOSIS — G47.33 OSA (OBSTRUCTIVE SLEEP APNEA): ICD-10-CM

## 2019-10-03 DIAGNOSIS — K92.1 BLOOD IN STOOL: Primary | ICD-10-CM

## 2019-10-03 PROCEDURE — 99214 OFFICE O/P EST MOD 30 MIN: CPT | Performed by: INTERNAL MEDICINE

## 2019-10-03 ASSESSMENT — MIFFLIN-ST. JEOR: SCORE: 2101.17

## 2019-10-17 ENCOUNTER — HOSPITAL ENCOUNTER (OUTPATIENT)
Facility: CLINIC | Age: 29
Discharge: HOME | End: 2019-10-17
Attending: INTERNAL MEDICINE | Admitting: INTERNAL MEDICINE
Payer: COMMERCIAL

## 2019-10-17 VITALS
SYSTOLIC BLOOD PRESSURE: 117 MMHG | DIASTOLIC BLOOD PRESSURE: 59 MMHG | OXYGEN SATURATION: 94 % | RESPIRATION RATE: 14 BRPM | HEART RATE: 87 BPM

## 2019-10-17 LAB
COLONOSCOPY: NORMAL
UPPER GI ENDOSCOPY: NORMAL

## 2019-10-17 PROCEDURE — 43239 EGD BIOPSY SINGLE/MULTIPLE: CPT | Performed by: INTERNAL MEDICINE

## 2019-10-17 PROCEDURE — 45380 COLONOSCOPY AND BIOPSY: CPT | Performed by: INTERNAL MEDICINE

## 2019-10-17 PROCEDURE — 25000128 H RX IP 250 OP 636: Performed by: INTERNAL MEDICINE

## 2019-10-17 PROCEDURE — 88305 TISSUE EXAM BY PATHOLOGIST: CPT | Performed by: INTERNAL MEDICINE

## 2019-10-17 PROCEDURE — 88305 TISSUE EXAM BY PATHOLOGIST: CPT | Mod: 26,59 | Performed by: INTERNAL MEDICINE

## 2019-10-17 PROCEDURE — G0500 MOD SEDAT ENDO SERVICE >5YRS: HCPCS | Performed by: INTERNAL MEDICINE

## 2019-10-17 PROCEDURE — 25000125 ZZHC RX 250: Performed by: INTERNAL MEDICINE

## 2019-10-17 RX ORDER — ONDANSETRON 2 MG/ML
4 INJECTION INTRAMUSCULAR; INTRAVENOUS EVERY 6 HOURS PRN
Status: DISCONTINUED | OUTPATIENT
Start: 2019-10-17 | End: 2019-10-17 | Stop reason: HOSPADM

## 2019-10-17 RX ORDER — ONDANSETRON 2 MG/ML
4 INJECTION INTRAMUSCULAR; INTRAVENOUS
Status: DISCONTINUED | OUTPATIENT
Start: 2019-10-17 | End: 2019-10-17 | Stop reason: HOSPADM

## 2019-10-17 RX ORDER — FENTANYL CITRATE 50 UG/ML
INJECTION, SOLUTION INTRAMUSCULAR; INTRAVENOUS PRN
Status: DISCONTINUED | OUTPATIENT
Start: 2019-10-17 | End: 2019-10-17 | Stop reason: HOSPADM

## 2019-10-17 RX ORDER — FLUMAZENIL 0.1 MG/ML
0.2 INJECTION, SOLUTION INTRAVENOUS
Status: DISCONTINUED | OUTPATIENT
Start: 2019-10-17 | End: 2019-10-17 | Stop reason: HOSPADM

## 2019-10-17 RX ORDER — ONDANSETRON 4 MG/1
4 TABLET, ORALLY DISINTEGRATING ORAL EVERY 6 HOURS PRN
Status: DISCONTINUED | OUTPATIENT
Start: 2019-10-17 | End: 2019-10-17 | Stop reason: HOSPADM

## 2019-10-17 RX ORDER — NALOXONE HYDROCHLORIDE 0.4 MG/ML
.1-.4 INJECTION, SOLUTION INTRAMUSCULAR; INTRAVENOUS; SUBCUTANEOUS
Status: DISCONTINUED | OUTPATIENT
Start: 2019-10-17 | End: 2019-10-17 | Stop reason: HOSPADM

## 2019-10-17 RX ORDER — LIDOCAINE 40 MG/G
CREAM TOPICAL
Status: DISCONTINUED | OUTPATIENT
Start: 2019-10-17 | End: 2019-10-17 | Stop reason: HOSPADM

## 2019-10-17 RX ORDER — ONDANSETRON 2 MG/ML
INJECTION INTRAMUSCULAR; INTRAVENOUS PRN
Status: DISCONTINUED | OUTPATIENT
Start: 2019-10-17 | End: 2019-10-17 | Stop reason: HOSPADM

## 2019-10-18 LAB — COPATH REPORT: NORMAL

## 2019-12-23 ENCOUNTER — OFFICE VISIT (OUTPATIENT)
Dept: SLEEP MEDICINE | Facility: CLINIC | Age: 29
End: 2019-12-23
Attending: INTERNAL MEDICINE
Payer: COMMERCIAL

## 2019-12-23 VITALS
BODY MASS INDEX: 36.36 KG/M2 | DIASTOLIC BLOOD PRESSURE: 81 MMHG | OXYGEN SATURATION: 94 % | WEIGHT: 254 LBS | RESPIRATION RATE: 16 BRPM | SYSTOLIC BLOOD PRESSURE: 131 MMHG | HEART RATE: 94 BPM | HEIGHT: 70 IN

## 2019-12-23 DIAGNOSIS — R29.818 SUSPECTED SLEEP APNEA: Primary | ICD-10-CM

## 2019-12-23 DIAGNOSIS — R06.81 WITNESSED APNEIC SPELLS: ICD-10-CM

## 2019-12-23 DIAGNOSIS — R06.83 SNORING: ICD-10-CM

## 2019-12-23 DIAGNOSIS — F51.04 INSOMNIA, PSYCHOPHYSIOLOGICAL: ICD-10-CM

## 2019-12-23 PROCEDURE — 99204 OFFICE O/P NEW MOD 45 MIN: CPT | Performed by: INTERNAL MEDICINE

## 2019-12-23 ASSESSMENT — MIFFLIN-ST. JEOR: SCORE: 2128.39

## 2019-12-23 NOTE — PATIENT INSTRUCTIONS
1. Home sleep apnea test       2. Instructions for treating Delayed Sleep Phase Syndrome:    Delayed Sleep Phase Syndrome (DSPS) means that your body's internal timing is set late compared to the 24 hour day. Therefore, it is often difficult to get up on time for work in the morning and sometimes difficult to fall asleep on time, in order to get enough sleep. People with DSPS often tend to like to stay up late on weekends and sleep in until between 10 AM and noon, sometimes even later.This is actually a bad habit that will perpetuate the problem. It reinforces your body's tendency to be on that later schedule.    You should go to bed when you are sleepy and ready to sleep. During this entire process, you should not engage in activities that may make it worse, such as watching TV in bed, leaving the TV on all night, drinking any caffeine 6 hours before bed or exercising 1-2 hours before bed.     Start taking Melatonin, 1 mg tablet 5 hours before the time that you fall asleep on average (not your desired bedtime or time that you get in bed, but the time you normally fall asleep on your own).     Upon awakening, get exposure to sun-light for about 30-45 minutes. You do not need to look at the sun, in fact, this is dangerous. Reading the paper with the sun shining on you is adequate.  Alternatively, you may use a Seasonal Affective Disorder Lamp (intensity 10,000 Lux) instead of the sun. The lamp should be positioned 1-2 arms lengths away from you. They lamps are sold at Home Medical Companies such as [employer] or [employer]. A prescription can be written to get insurance coverage in some cases. They are also sold on [employer].    Using the light and melatonin should help march your internal clock (known as your circadian rhythms) gradually earlier. As your bedtime advances, remember to take your melatonin earlier, keeping it 5 hours before your fall asleep time.    Avoid naps and sleeping in because  sleeping during the day will delay your body's clock and you will have to start from scratch.     More information about light therapy:    If you have any concerns regarding the safety of bright light therapy for you, it is recommended that you consult an ophthalmologist before using a light box.  If you have a condition that makes your eyes very sensitive to light, macular degeneration, a family history of such problems, or diabetic changes to your eyes, consult an ophthalmologist before using a light box. If you have anxiety disorder and have an increase in anxiety discontinue use.    Your BMI is Body mass index is 36.45 kg/m .  Weight management is a personal decision.  If you are interested in exploring weight loss strategies, the following discussion covers the approaches that may be successful. Body mass index (BMI) is one way to tell whether you are at a healthy weight, overweight, or obese. It measures your weight in relation to your height.  A BMI of 18.5 to 24.9 is in the healthy range. A person with a BMI of 25 to 29.9 is considered overweight, and someone with a BMI of 30 or greater is considered obese. More than two-thirds of American adults are considered overweight or obese.  Being overweight or obese increases the risk for further weight gain. Excess weight may lead to heart disease and diabetes.  Creating and following plans for healthy eating and physical activity may help you improve your health.  Weight control is part of healthy lifestyle and includes exercise, emotional health, and healthy eating habits. Careful eating habits lifelong are the mainstay of weight control. Though there are significant health benefits from weight loss, long-term weight loss with diet alone may be very difficult to achieve- studies show long-term success with dietary management in less than 10% of people. Attaining a healthy weight may be especially difficult to achieve in those with severe obesity. In some cases,  medications, devices and surgical management might be considered.  What can you do?  If you are overweight or obese and are interested in methods for weight loss, you should discuss this with your provider.     Consider reducing daily calorie intake by 500 calories.     Keep a food journal.     Avoiding skipping meals, consider cutting portions instead.    Diet combined with exercise helps maintain muscle while optimizing fat loss. Strength training is particularly important for building and maintaining muscle mass. Exercise helps reduce stress, increase energy, and improves fitness. Increasing exercise without diet control, however, may not burn enough calories to loose weight.       Start walking three days a week 10-20 minutes at a time    Work towards walking thirty minutes five days a week     Eventually, increase the speed of your walking for 1-2 minutes at time    In addition, we recommend that you review healthy lifestyles and methods for weight loss available through the National Institutes of Health patient information sites:  http://win.niddk.nih.gov/publications/index.htm    And look into health and wellness programs that may be available through your health insurance provider, employer, local community center, or alfred club.    Weight management plan: Patient was referred to their PCP to discuss a diet and exercise plan.

## 2019-12-23 NOTE — NURSING NOTE
"Chief Complaint   Patient presents with     Sleep Problem     Heavy snoring, insomnia        Initial /81   Pulse 94   Resp 16   Ht 1.778 m (5' 10\")   Wt 115.2 kg (254 lb)   SpO2 94%   BMI 36.45 kg/m   Estimated body mass index is 36.45 kg/m  as calculated from the following:    Height as of this encounter: 1.778 m (5' 10\").    Weight as of this encounter: 115.2 kg (254 lb).    Medication Reconciliation: complete    Neck circumference: 17.5 inches /44centimeters.    ESS 7    Vania Kohler MA      "

## 2019-12-23 NOTE — PROGRESS NOTES
Sleep Consultation:    Date on this visit: 2019    Mason Allen is sent by Francine Alejandre for a sleep consultation regarding snoring.    Primary Physician: Francine Alejandre     Chief complaint: snoring, witnessed apneas     Presenting History:     Mason Allen reports nightly snoring and frequent apneas and poor quality of sleep for last 2 years.     Medical history is significant for ADHD.     Mason does snore every night. Patient does have a regular bed partner. There is report of snoring, choking and poor quality of sleep.  He does have witnessed apneas. They frequently sleep separately.  Patient sleeps on his back, side and stomach. He has frequent morning dry mouth and morning headaches, denies no restless legs. Mason denies any bruxism, sleep walking, sleep talking, dream enactment, sleep paralysis, cataplexy and hypnogogic/hypnopompic hallucinations.    Patient has a delayed sleep phase circadian rhythm. Natural sleep time can be round 2-3 am. He has struggled with chronic sleep initiation insomnia.     Currently, sleep is disrupted as he has a , 10 days old.     Mason goes to sleep at 10:00 PM during the week. He wakes up at 8:30 AM without an alarm. He falls asleep in 90 minutes.  Mason has difficulty falling asleep.  He wakes up 2-4 times a night for 5 minutes before falling back to sleep.  Mason wakes up to uncertain reasons and external stimuli.  On weekends, Mason goes to sleep at 12:00 AM.  He wakes up at 9:00 AM without an alarm. He falls asleep in 60 minutes.  Patient gets an average of 7-8 hours of sleep per night.     Mason denies difficulty breathing through his nose.      Patient's Merrill Sleepiness score 7/24 consistent with no daytime sleepiness.      Mason naps 1-2 times per week for  minutes, feels refreshed after naps. He takes no inadvertant naps.  He denies closing eyes, dozing and falling asleep while driving. Patient was counseled on the importance of driving while alert, to  pull over if drowsy, or nap before getting into the vehicle if sleepy.      He uses 1-2 cups/day of coffee. Last caffeine intake is usually before noon.    Allergies:    Allergies   Allergen Reactions     Bees      Swelling of neck       Medications:    Current Outpatient Medications   Medication Sig Dispense Refill     albuterol (PROAIR HFA, PROVENTIL HFA, VENTOLIN HFA) 108 (90 BASE) MCG/ACT inhaler Inhale 2 puffs into the lungs daily as needed for shortness of breath / dyspnea or wheezing (use prior to exercise) (Patient not taking: Reported on 12/23/2019) 3 Inhaler 3     lisdexamfetamine (VYVANSE) 40 MG capsule Take 1 capsule (40 mg) by mouth every morning (Patient not taking: Reported on 12/23/2019) 30 capsule 0       Problem List:  Patient Active Problem List    Diagnosis Date Noted     Snoring 10/03/2019     Priority: Medium     Attention deficit disorder with hyperactivity(314.01) 07/05/2016     Priority: Medium     JONATHON (generalized anxiety disorder) 07/05/2016     Priority: Medium     Seasonal allergic rhinitis 07/05/2016     Priority: Medium        Past Medical/Surgical History:  Past Medical History:   Diagnosis Date     Blood in stool      Past Surgical History:   Procedure Laterality Date     COLONOSCOPY N/A 10/17/2019    Procedure: COLONOSCOPY, WITH POLYPECTOMY AND BIOPSY;  Surgeon: Sahil Henderson MD;  Location: [facility]     ESOPHAGOSCOPY, GASTROSCOPY, DUODENOSCOPY (EGD), COMBINED N/A 10/17/2019    Procedure: ESOPHAGOGASTRODUODENOSCOPY, WITH BIOPSY;  Surgeon: Sahil Henderson MD;  Location: [facility]         Social History:  Social History     Socioeconomic History     Marital status:      Spouse name: Not on file     Number of children: Not on file     Years of education: Not on file     Highest education level: Not on file   Occupational History     Not on file   Social Needs     Financial resource strain: Not on file     Food insecurity:     Worry: Not on file     Inability:  Not on file     Transportation needs:     Medical: Not on file     Non-medical: Not on file   Tobacco Use     Smoking status: Never Smoker     Smokeless tobacco: Never Used   Substance and Sexual Activity     Alcohol use: Yes     Alcohol/week: 0.0 standard drinks     Comment: one drink a week     Drug use: Never     Sexual activity: Yes     Partners: Female   Lifestyle     Physical activity:     Days per week: Not on file     Minutes per session: Not on file     Stress: Not on file   Relationships     Social connections:     Talks on phone: Not on file     Gets together: Not on file     Attends Alevism service: Not on file     Active member of club or organization: Not on file     Attends meetings of clubs or organizations: Not on file     Relationship status: Not on file     Intimate partner violence:     Fear of current or ex partner: Not on file     Emotionally abused: Not on file     Physically abused: Not on file     Forced sexual activity: Not on file   Other Topics Concern     Parent/sibling w/ CABG, MI or angioplasty before 65F 55M? Not Asked   Social History Narrative    .  Works in [email or web address].        Family History:  Family History   Problem Relation Age of Onset     Crohn's Disease Paternal Grandfather      Colon Cancer Paternal Grandfather      - Grandfather had sleep apnea.     Review of Systems:  A complete review of systems reviewed by me is negative with the exeption of what has been mentioned in the history of present illness.  CONSTITUTIONAL: NEGATIVE for weight gain/loss, fever, chills, sweats or night sweats, drug allergies.  EYES: NEGATIVE for changes in vision, blind spots, double vision.  ENT: NEGATIVE for ear pain, sore throat, sinus pain, post-nasal drip, runny nose, bloody nose  CARDIAC: NEGATIVE for fast heartbeats or fluttering in chest, chest pain or pressure, breathlessness when lying flat, swollen legs or swollen feet.  NEUROLOGIC: NEGATIVE headaches, weakness or numbness  "in the arms or legs.  DERMATOLOGIC: NEGATIVE for rashes, new moles or change in mole(s)  PULMONARY: NEGATIVE SOB at rest, SOB with activity, dry cough, productive cough, coughing up blood, wheezing or whistling when breathing.    GASTROINTESTINAL: NEGATIVE for nausea or vomitting, loose or watery stools, fat or grease in stools, constipation, abdominal pain, bowel movements black in color or blood noted.  GENITOURINARY: NEGATIVE for pain during urination, blood in urine, urinating more frequently than usual, irregular menstrual periods.  MUSCULOSKELETAL: NEGATIVE for muscle pain, bone or joint pain, swollen joints.  ENDOCRINE: NEGATIVE for increased thirst or urination, diabetes.  LYMPHATIC: NEGATIVE for swollen lymph nodes, lumps or bumps in the breasts or nipple discharge.    Physical Examination:  Vitals: /81   Pulse 94   Resp 16   Ht 1.778 m (5' 10\")   Wt 115.2 kg (254 lb)   SpO2 94%   BMI 36.45 kg/m    BMI= Body mass index is 36.45 kg/m .    Neck Cir (cm): 44 cm    [place] Total Score 12/23/2019   Total score - [place] 7       HUYEN Total Score: 16 (12/23/19 1342)    GENERAL APPEARANCE: healthy, alert and no distress  EYES: Eyes grossly normal to inspection, PERRL and conjunctivae and sclerae normal  HENT: nose and mouth without ulcers or lesions, oropharynx crowded, uvula elongated, soft palate dependent and tongue base enlarged  NECK: no adenopathy, no asymmetry, masses, or scars and thyroid normal to palpation  RESP: lungs clear to auscultation - no rales, rhonchi or wheezes  CV: regular rates and rhythm, normal S1 S2, no S3 or S4 and no murmur, click or rub  ABDOMEN: soft, nontender, without hepatosplenomegaly or masses and bowel sounds normal  MS: extremities normal- no gross deformities noted  NEURO: Normal strength and tone, mentation intact and speech normal  PSYCH: mentation appears normal and affect normal/bright  Mallampati Class: IV.  Tonsillar Stage: 1  hidden by " pillars.    Impression/Plan:    1. Probable obstructive sleep apnea   2. Delayed sleep phase circadian rhythm   3. Chronic Insomnia     - Patient, 29 years old male, BMI 36, neck circumference 44 cm, presents with history of of snoring, witnessed apneas and non-restorative sleep. There is a high risk for sleep apnea and an overnight sleep study is recommended for evaluation.     - Patient struggles with sleep initiation due to circadian misalignment and chronic psychophysiologic hyperarousal. We reviewed light therapy and melatonin for management of delayed sleep phase. Behavioral manegement of insomnia was also discussed. Patient can consider a consultation with Behavioral sleep medicine.     Plan:     1. Home sleep apnea testing     He will follow up with me in approximately two weeks after his sleep study has been competed to review the results and discuss plan of care.       Polysomnography & HST reviewed.  Obstructive sleep apnea reviewed.  Complications of untreated sleep apnea were reviewed.    Dr. Olivier Moulton     CC: Francine Alejandre

## 2020-01-21 ENCOUNTER — OFFICE VISIT (OUTPATIENT)
Dept: SLEEP MEDICINE | Facility: CLINIC | Age: 30
End: 2020-01-21
Attending: INTERNAL MEDICINE
Payer: COMMERCIAL

## 2020-01-21 DIAGNOSIS — R29.818 SUSPECTED SLEEP APNEA: ICD-10-CM

## 2020-01-21 DIAGNOSIS — R06.81 WITNESSED APNEIC SPELLS: ICD-10-CM

## 2020-01-21 DIAGNOSIS — R06.83 SNORING: ICD-10-CM

## 2020-01-21 DIAGNOSIS — G47.33 OSA (OBSTRUCTIVE SLEEP APNEA): ICD-10-CM

## 2020-01-21 PROCEDURE — G0399 HOME SLEEP TEST/TYPE 3 PORTA: HCPCS | Performed by: INTERNAL MEDICINE

## 2020-01-22 ENCOUNTER — DOCUMENTATION ONLY (OUTPATIENT)
Dept: SLEEP MEDICINE | Facility: CLINIC | Age: 30
End: 2020-01-22
Payer: COMMERCIAL

## 2020-01-22 NOTE — PROGRESS NOTES
This HSAT was performed using a Noxturnal T3 device which recorded snore, sound, movement activity, body position, nasal pressure, oronasal thermal airflow, pulse, oximetry and both chest and abdominal respiratory effort. HSAT data was restricted to the time patient states they were in bed.     HSAT was scored using 1B 4% hypopnea rule.     HST AHI (Non-PAT): 53.5  Snoring was reported as loud.  Time with SpO2 below 89% was 12.2 minutes.   Overall signal quality was good.     Pt will follow up with sleep provider to determine appropriate therapy.

## 2020-01-23 ENCOUNTER — TELEPHONE (OUTPATIENT)
Dept: SLEEP MEDICINE | Facility: CLINIC | Age: 30
End: 2020-01-23

## 2020-01-23 DIAGNOSIS — G47.33 OSA (OBSTRUCTIVE SLEEP APNEA): Primary | ICD-10-CM

## 2020-01-23 NOTE — TELEPHONE ENCOUNTER
CALLED PATIENT TO SCHEDULE CPAP SET UP. NO ANSWER. PATIENTS MAILBOX IS FULL SO UNABLE TO LEAVE VOICEMAIL.

## 2020-01-23 NOTE — PROCEDURES
"HOME SLEEP STUDY INTERPRETATION    Patient: Mason Allen  MRN: [id number]  YOB: 1990  Study Date: 2020  Referring Provider: Francine Alejandre;   Ordering Provider: Olivier Moulton MD, MD     Indications for Home Study: Mason Allen is a 29 year old male with a history of ADHD who presents with symptoms suggestive of obstructive sleep apnea.    Estimated body mass index is 36.45 kg/m  as calculated from the following:    Height as of 19: 1.778 m (5' 10\").    Weight as of 19: 115.2 kg (254 lb).  Total score - [place]: 7 (2019  1:42 PM)  STOP-BAN/8    Data: A full night home sleep study was performed recording the standard physiologic parameters including body position, movement, sound, nasal pressure, thermal oral airflow, chest and abdominal movements with respiratory inductance plethysmography, and oxygen saturation by pulse oximetry. Pulse rate was estimated by oximetry recording. This study was considered adequate based on > 4 hours of quality oximetry and respiratory recording. As specified by the AASM Manual for the Scoring of Sleep and Associated events, version 2.3, Rule VIII.D 1B, 4% oxygen desaturation scoring for hypopneas is used as a standard of care on all home sleep apnea testing.    Analysis Time:  421.3 minutes    Respiration:   Sleep Associated Hypoxemia: sustained hypoxemia was present. Baseline oxygen saturation was 95.9%.  Time with saturation less than or equal to 88% was 12.2 minutes. The lowest oxygen saturation was 82%.   Snoring: Snoring was present.  Respiratory events: The home study revealed a presence of 199 obstructive apneas and 14 mixed and central apneas. There were 163 hypopneas resulting in a combined apnea/hypopnea index [AHI] of 53.5 events per hour.  AHI was 88.8 per hour supine, 1.9 per hour prone, 45.3 per hour on left side, and 53 per hour on right side.   Pattern: Excluding events noted above, respiratory rate and pattern was " Normal.    Position: Percent of time spent: supine - 27.9%, prone - 14.7%, on left - 22.3%, on right - 34.5%.    Heart Rate: By pulse oximetry normal rate was noted.     Assessment:   Severe obstructive sleep apnea.  Sleep associated hypoxemia was present.    Recommendations:  CPAP therapy is the treatment of choice for severe sleep apnea. Consider auto-CPAP at 5-15 cmH2O.  Weight management.    Diagnosis Code(s): Obstructive Sleep Apnea G47.33, Hypoxemia G47.36    Olivier Moulton MD, MD, January 23, 2020   Diplomate, American Board of Psychiatry and Neurology, Sleep Medicine

## 2020-01-23 NOTE — TELEPHONE ENCOUNTER
Sleep Study Follow-Up Visit:    Date on this visit: 1/23/2020    Mason Allen was contacted  for follow-up of his home sleep study done on 1/21/2020 at the [facility] [place] for possible sleep apnea.    Respiratory events: The home study revealed a presence of 199 obstructive apneas and 14 mixed and central apneas. There were 163 hypopneas resulting in a combined apnea/hypopnea index [AHI] of 53.5 events per hour.  AHI was 88.8 per hour supine, 1.9 per hour prone, 45.3 per hour on left side, and 53 per hour on right side.   Pattern: Excluding events noted above, respiratory rate and pattern was Normal.    Sleep Associated Hypoxemia: sustained hypoxemia was present. Baseline oxygen saturation was 95.9%.  Time with saturation less than or equal to 88% was 12.2 minutes. The lowest oxygen saturation was 82%.   Snoring: Snoring was present.     Position: Percent of time spent: supine - 27.9%, prone - 14.7%, on left - 22.3%, on right - 34.5%.     Heart Rate: By pulse oximetry normal rate was noted.      Assessment:   Severe obstructive sleep apnea.  Sleep associated hypoxemia was present.    These findings were reviewed with patient.     Past medical/surgical history, family history, social history, medications and allergies were reviewed.      Problem List:  Patient Active Problem List    Diagnosis Date Noted     MOLLY (obstructive sleep apnea) 01/23/2020     Priority: Medium     Severe MOLLY       Snoring 10/03/2019     Priority: Medium     Attention deficit disorder with hyperactivity(314.01) 07/05/2016     Priority: Medium     JONATHON (generalized anxiety disorder) 07/05/2016     Priority: Medium     Seasonal allergic rhinitis 07/05/2016     Priority: Medium        Impression/Plan:    1. Severe Obstructive sleep apnea     - Patient was counseled regarding severe sleep apnea,consequences and management, CPAP therapy is recommended for severe sleep apnea.     Plan:     1. Start auto PAP therapy     He will follow  up with me in about 7 week(s).       Dr. Olivier Moulton     CC: Francine Alejandre

## 2020-01-23 NOTE — NURSING NOTE
Pt returned HST device. It was downloaded and forwarded data to the clinical specialist for scoring.

## 2020-01-24 ENCOUNTER — TELEPHONE (OUTPATIENT)
Dept: SLEEP MEDICINE | Facility: CLINIC | Age: 30
End: 2020-01-24

## 2020-01-24 NOTE — TELEPHONE ENCOUNTER
PT PAP APPT [facility] FOR [place]/[address] ON 1/29/2020 AT 11AM WITH MATIAS ALDRID[facility]. EMAIL SENT TO MATIAS ALDRIDGE

## 2020-01-29 ENCOUNTER — DOCUMENTATION ONLY (OUTPATIENT)
Dept: SLEEP MEDICINE | Facility: CLINIC | Age: 30
End: 2020-01-29

## 2020-01-29 ENCOUNTER — APPOINTMENT (OUTPATIENT)
Dept: SLEEP MEDICINE | Facility: CLINIC | Age: 30
End: 2020-01-29
Payer: COMMERCIAL

## 2020-01-29 DIAGNOSIS — G47.33 OSA (OBSTRUCTIVE SLEEP APNEA): ICD-10-CM

## 2020-01-29 NOTE — PROGRESS NOTES
Patient was offered choice of vendor and chose [facility].  Patient Mason Allen was set up at [place] on January 29, 2020. Patient received a Resmed AirSense 10 Auto. Pressures were set at 5-15 cm H2O.   Patient s ramp is 5 cm H2O for Off and FLEX/EPR is 2.  Patient received a Resmed Mask name: [id number]  Pillow mask size Medium, heated tubing and heated humidifier.  Patient does need to meet compliance. Patient has a follow up on TBD.  Mayra Key

## 2020-02-03 ENCOUNTER — DOCUMENTATION ONLY (OUTPATIENT)
Dept: SLEEP MEDICINE | Facility: CLINIC | Age: 30
End: 2020-02-03

## 2020-02-03 DIAGNOSIS — G47.33 OSA (OBSTRUCTIVE SLEEP APNEA): ICD-10-CM

## 2020-02-03 NOTE — PROGRESS NOTES
3 DAY STM VISIT    Diagnostic AHI:   53.5  HST    Patient contacted for 3 day STM visit  Data only recheck unable to leave voicemail.  Mailbox is full.     Replacement device: No  STM ordered by provider: Yes     Device type: Auto-CPAP  PAP settings from order::  CPAP min 5 cm  H20       CPAP max 15 cm  H20        Mask type:    Nasal Mask     Device settings from machine      Min CPAP 5.0            Max CPAP 15.0            Assessment: Nightly usage over four hours.  Action plan: Patient to have 14 day STM visit. Patient has a follow up visit scheduled:   Yes but it is too soon for insurance compliance. .    Total time spent on accessing, interpreting remote patient PAP therapy data, reviewing and and counseling with patient on therapy adaptation   0 minutes    [id number] no

## 2020-02-13 ENCOUNTER — DOCUMENTATION ONLY (OUTPATIENT)
Dept: SLEEP MEDICINE | Facility: CLINIC | Age: 30
End: 2020-02-13
Payer: COMMERCIAL

## 2020-02-13 DIAGNOSIS — G47.33 OSA (OBSTRUCTIVE SLEEP APNEA): ICD-10-CM

## 2020-02-26 ENCOUNTER — DOCUMENTATION ONLY (OUTPATIENT)
Dept: SLEEP MEDICINE | Facility: CLINIC | Age: 30
End: 2020-02-26
Payer: COMMERCIAL

## 2020-02-26 DIAGNOSIS — G47.33 OSA (OBSTRUCTIVE SLEEP APNEA): ICD-10-CM

## 2020-02-26 NOTE — PROGRESS NOTES
Pt contacted for STM pre-clinical visit.  Pt states that since starting CPAP, his issues with insomnia have completely resolved and he is sleeping great.

## 2020-03-02 ENCOUNTER — DOCUMENTATION ONLY (OUTPATIENT)
Dept: SLEEP MEDICINE | Facility: CLINIC | Age: 30
End: 2020-03-02

## 2020-03-02 DIAGNOSIS — G47.33 OSA (OBSTRUCTIVE SLEEP APNEA): ICD-10-CM

## 2020-03-02 NOTE — PROGRESS NOTES
30 DAY STM VISIT    Diagnostic AHI:  53.5 HST    Subjective measures:   Pt states things are going well and has no issues or complaints.  Pt is benefiting from therapy.    Assessment: Pt meeting objective benchmarks.  Patient meeting subjective benchmarks.   Action plan: pt to have 6 month STM visit  Patient has scheduled a follow up visit with Dr. Moulton on 3/30/20.   Device type: Auto-CPAP  PAP settings: CPAP min 5.0 cm  H20     CPAP max 15.0 cm  H20    95th% pressure 12.7 cm  H20   Mask type:  Nasal Mask  Objective measures: 14 day rolling measures      Compliance  78 %      Leak  3.52 lpm  last  upload      AHI 3.53   last  upload      Average number of minutes 370      Objective measure goal  Compliance   Goal >70%  Leak   Goal < 24 lpm  AHI  Goal < 5  Usage  Goal >240        Total time spent on accessing and interpreting remote patient PAP therapy data  10 minutes    Total time spent counseling, coaching  and reviewing PAP therapy data with patient  3 minutes     [id number] no this call  [id number] no  at 3 or 14 day [facility]

## 2020-03-10 ENCOUNTER — HEALTH MAINTENANCE LETTER (OUTPATIENT)
Age: 30
End: 2020-03-10

## 2020-03-22 ENCOUNTER — NURSE TRIAGE (OUTPATIENT)
Dept: NURSING | Facility: CLINIC | Age: 30
End: 2020-03-22

## 2020-03-23 NOTE — TELEPHONE ENCOUNTER
"C/o dizziness / lightheadedness for past 24-48 hours. No spinning or moving sensation. Worse w/ position change (any). Also hearing a \"whooshing\" sound w/ eye movement. On low carb diet currently but not extreme - still consuming some carbs. Pushing water in past 2 days hoping this would help the dizziness but it hasn't. No SOB or chest discomfort. No syncope. No fever or cough. Advised see provider w/i 4 hrs. [email or web address] visit and pt agreeable.     Reason for Disposition    [1] Dizziness caused by heat exposure, sudden standing, or poor fluid intake AND [2] no improvement after 2 hours of rest and fluids    Additional Information    Negative: Severe difficulty breathing (e.g., struggling for each breath, speaks in single words)    Negative: [1] Difficulty breathing or swallowing AND [2] started suddenly after medicine, an allergic food or bee sting    Negative: Shock suspected (e.g., cold/pale/clammy skin, too weak to stand, low BP, rapid pulse)    Negative: Difficult to awaken or acting confused (e.g., disoriented, slurred speech)    Negative: [1] Weakness (i.e., paralysis, loss of muscle strength) of the face, arm or leg on one side of the body AND [2] sudden onset AND [3] present now    Negative: [1] Numbness (i.e., loss of sensation) of the face, arm or leg on one side of the body AND [2] sudden onset AND [3] present now    Negative: [1] Loss of speech or garbled speech AND [2] sudden onset AND [3] present now    Negative: Overdose (accidental or intentional) of medications    Negative: [1] Fainted > 15 minutes ago AND [2] still feels too weak or dizzy to stand    Negative: Heart beating < 50 beats per minute OR > 140 beats per minute    Negative: Sounds like a life-threatening emergency to the triager    Negative: Chest pain    Negative: Rectal bleeding, bloody stool, or tarry-black stool    Negative: [1] Vomiting AND [2] contains red blood or black (\"coffee ground\") material    Negative: Vomiting is main " "symptom    Negative: Diarrhea is main symptom    Negative: Headache is main symptom    Negative: Patient states that he/she is having an anxiety/panic attack    Negative: Dizziness from low blood sugar (i.e., < 60 mg/dl or 3.5 mmol/l)    Negative: Dizziness is described as a spinning sensation (i.e., vertigo)    Negative: Heat exhaustion suspected (i.e., dehydration from heat exposure)    Negative: Difficulty breathing    Negative: SEVERE dizziness (e.g., unable to stand, requires support to walk, feels like passing out now)    Negative: Extra heart beats OR irregular heart beating  (i.e., \"palpitations\")    Negative: [1] Drinking very little AND [2] dehydration suspected (e.g., no urine > 12 hours, very dry mouth, very lightheaded)    Negative: Patient sounds very sick or weak to the triager    Protocols used: DIZZINESS - [id number]      "

## 2020-03-27 VITALS — HEIGHT: 70 IN | BODY MASS INDEX: 36.36 KG/M2 | WEIGHT: 254 LBS

## 2020-03-27 ASSESSMENT — MIFFLIN-ST. JEOR: SCORE: 2123.39

## 2020-03-27 NOTE — PATIENT INSTRUCTIONS
Your BMI is Body mass index is 36.45 kg/m .  Weight management is a personal decision.  If you are interested in exploring weight loss strategies, the following discussion covers the approaches that may be successful. Body mass index (BMI) is one way to tell whether you are at a healthy weight, overweight, or obese. It measures your weight in relation to your height.  A BMI of 18.5 to 24.9 is in the healthy range. A person with a BMI of 25 to 29.9 is considered overweight, and someone with a BMI of 30 or greater is considered obese. More than two-thirds of American adults are considered overweight or obese.  Being overweight or obese increases the risk for further weight gain. Excess weight may lead to heart disease and diabetes.  Creating and following plans for healthy eating and physical activity may help you improve your health.  Weight control is part of healthy lifestyle and includes exercise, emotional health, and healthy eating habits. Careful eating habits lifelong are the mainstay of weight control. Though there are significant health benefits from weight loss, long-term weight loss with diet alone may be very difficult to achieve- studies show long-term success with dietary management in less than 10% of people. Attaining a healthy weight may be especially difficult to achieve in those with severe obesity. In some cases, medications, devices and surgical management might be considered.  What can you do?  If you are overweight or obese and are interested in methods for weight loss, you should discuss this with your provider.     Consider reducing daily calorie intake by 500 calories.     Keep a food journal.     Avoiding skipping meals, consider cutting portions instead.    Diet combined with exercise helps maintain muscle while optimizing fat loss. Strength training is particularly important for building and maintaining muscle mass. Exercise helps reduce stress, increase energy, and improves fitness.  Increasing exercise without diet control, however, may not burn enough calories to loose weight.       Start walking three days a week 10-20 minutes at a time    Work towards walking thirty minutes five days a week     Eventually, increase the speed of your walking for 1-2 minutes at time    In addition, we recommend that you review healthy lifestyles and methods for weight loss available through the National Institutes of Health patient information sites:  http://win.niddk.nih.gov/publications/index.htm    And look into health and wellness programs that may be available through your health insurance provider, employer, local community center, or alfred club.    Weight management plan: Patient was referred to their PCP to discuss a diet and exercise plan.        Your Body mass index is 36.45 kg/m .  Weight management is a personal decision.  If you are interested in exploring weight loss strategies, the following discussion covers the approaches that may be successful. Body mass index (BMI) is one way to tell whether you are at a healthy weight, overweight, or obese. It measures your weight in relation to your height.  A BMI of 18.5 to 24.9 is in the healthy range. A person with a BMI of 25 to 29.9 is considered overweight, and someone with a BMI of 30 or greater is considered obese. More than two-thirds of American adults are considered overweight or obese.  Being overweight or obese increases the risk for further weight gain. Excess weight may lead to heart disease and diabetes.  Creating and following plans for healthy eating and physical activity may help you improve your health.  Weight control is part of healthy lifestyle and includes exercise, emotional health, and healthy eating habits. Careful eating habits lifelong are the mainstay of weight control. Though there are significant health benefits from weight loss, long-term weight loss with diet alone may be very difficult to achieve- studies show long-term  success with dietary management in less than 10% of people. Attaining a healthy weight may be especially difficult to achieve in those with severe obesity. In some cases, medications, devices and surgical management might be considered.  What can you do?  If you are overweight or obese and are interested in methods for weight loss, you should discuss this with your provider.     Consider reducing daily calorie intake by 500 calories.     Keep a food journal.     Avoiding skipping meals, consider cutting portions instead.    Diet combined with exercise helps maintain muscle while optimizing fat loss. Strength training is particularly important for building and maintaining muscle mass. Exercise helps reduce stress, increase energy, and improves fitness. Increasing exercise without diet control, however, may not burn enough calories to loose weight.       Start walking three days a week 10-20 minutes at a time    Work towards walking thirty minutes five days a week     Eventually, increase the speed of your walking for 1-2 minutes at time    In addition, we recommend that you review healthy lifestyles and methods for weight loss available through the National Institutes of Health patient information sites:  http://win.niddk.nih.gov/publications/index.htm    And look into health and wellness programs that may be available through your health insurance provider, employer, local community center, or alfred club.    {Weight Management Plan -- Delete if patient has a normal BMI:575157}

## 2020-03-27 NOTE — PROGRESS NOTES
"Cpap usage viewable via Synopsis    Have you been in contact with anyone with COVID-19 in the last 30 days No    Do you have any of the following symptoms:   Cough No  Fever No  Rash No  Shortness of breath No    Have you Traveled in the last 30 day? No  Have you been in contact with anyone who traveled in the last 30 day?   No    Mason Allen is a 30 year old male who is being evaluated via a billable telephone visit.      The patient has been notified of following:     \"This telephone visit will be conducted via a call between you and your physician/provider. We have found that certain health care needs can be provided without the need for a physical exam.  This service lets us provide the care you need with a short phone conversation.  If a prescription is necessary we can send it directly to your pharmacy.  If lab work is needed we can place an order for that and you can then stop by our lab to have the test done at a later time.    If during the course of the call the physician/provider feels a telephone visit is not appropriate, you will not be charged for this service.\"     Physician has received verbal consent for a Telephone Visit from the patient? Yes    Mason Allen complains of  No chief complaint on file.      I have reviewed and updated the patient's Past Medical History, Social History, Family History and Medication List.    ALLERGIES  VAN Oneal            "

## 2020-03-30 ENCOUNTER — VIRTUAL VISIT (OUTPATIENT)
Dept: SLEEP MEDICINE | Facility: CLINIC | Age: 30
End: 2020-03-30
Payer: COMMERCIAL

## 2020-03-30 DIAGNOSIS — G47.33 OSA (OBSTRUCTIVE SLEEP APNEA): ICD-10-CM

## 2020-03-30 PROCEDURE — 99212 OFFICE O/P EST SF 10 MIN: CPT | Mod: TEL | Performed by: INTERNAL MEDICINE

## 2020-03-30 NOTE — PROGRESS NOTES
"  Obstructive Sleep Apnea - PAP Follow-Up Visit:    No chief complaint on file.      Mason Allen was contacted today for follow-up of their severe sleep apnea, managed with CPAP.     Overall, he rates the experience with PAP as 10 (0 poor, 10 great). The mask is comfortable. The mask is not leaking.  He is not snoring with the mask on. He is not having gasp arousals.  He is not having significant oral/nasal dryness. The pressure settings are comfortable.     He uses nasal pillows.     Sleep schedule is variable as he takes care of his .       Patient is using PAP therapy 7 hours per night. The patient is usually getting 7 hours of sleep per night.    He does feel rested in the morning.    Total score - [place]: 4 (3/27/2020  2:00 PM)    [facility]     Auto-PAP 5-15 cmH2O download:  89% days with >4 hours use.  Average use 7 hours 25 minutes per day.  95%ile Leak 2.7 L/min. CPAP 95% pressure 11.5 cm. AHI 4.14        Reviewed by team: Tobacco  Allergies  Meds       Reviewed by provider:        Problem List:  Patient Active Problem List    Diagnosis Date Noted     MOLLY (obstructive sleep apnea) 2020     Priority: Medium     Severe MOLLY       Snoring 10/03/2019     Priority: Medium     Attention deficit disorder with hyperactivity(314.01) 2016     Priority: Medium     JONATHON (generalized anxiety disorder) 2016     Priority: Medium     Seasonal allergic rhinitis 2016     Priority: Medium          Ht 1.778 m (5' 10\")   Wt 115.2 kg (254 lb)   BMI 36.45 kg/m      Impression/Plan:     Severe sleep apnea.     -  Tolerating PAP well. Daytime symptoms are improved. Significant improvement in sleep quality and daytime function is reported.     Plan:     1. Continue auto PAP therapy     Mason Allen will follow up in about 1 year(s).     Five minutes spent with patient, all of which were spent counseling, consulting, coordinating plan of care.      Olivier Moulton MD, MD    CC:  Francine Alejandre,   "

## 2020-06-05 NOTE — TELEPHONE ENCOUNTER
Send Morenita refill to RN covering for Adriana.  Kelly ALSTON, CNP     Detail Level: Simple Detail Level: Generalized Detail Level: Detailed Detail Level: Zone

## 2020-08-11 ENCOUNTER — DOCUMENTATION ONLY (OUTPATIENT)
Dept: SLEEP MEDICINE | Facility: CLINIC | Age: 30
End: 2020-08-11
Payer: COMMERCIAL

## 2020-08-11 DIAGNOSIS — G47.33 OSA (OBSTRUCTIVE SLEEP APNEA): ICD-10-CM

## 2020-08-11 NOTE — PROGRESS NOTES
6 month [facility]    STM Recheck Visit     Diagnostic AHI:  53.5 HST    Data only recheck     Assessment: Pt meeting objective benchmarks.  Patient compliance is 97% the last 180 days.   Action plan:   pt to follow up per provider request       Device type: Auto-CPAP  PAP settings: CPAP min 5.0 cm  H20     CPAP max 15.0 cm  H20    95th% pressure 12 cm  H20   Objective measures: 14 day rolling measures      Compliance  92 %      Leak  2.91 lpm  last  upload      AHI 2.38   last  upload      Average number of minutes 488      Objective measure goal  Compliance   Goal >70%  Leak   Goal < 24 lpm  AHI  Goal < 5  Usage  Goal >240    Total time spent on accessing, reviewing and interpreting remote patient PAP therapy data:   10 minutes      Total time spent with direct patient communication :   0 minutes

## 2020-12-14 NOTE — TELEPHONE ENCOUNTER
This is a duplicate. See other encounter  
vyvanse   Last Written Prescription Date:  4/26/18  Last Fill Quantity:30  # refills: 0  Last Office Visit : 9/14/18  Future Office visit: 6/21/18    Routing refill request to nurse for review/approval because:  Drug not on the [facility], [facility] or [facility] refill protocol or controlled substance      
Detail Level: Detailed

## 2020-12-27 ENCOUNTER — HEALTH MAINTENANCE LETTER (OUTPATIENT)
Age: 30
End: 2020-12-27

## 2021-01-15 ENCOUNTER — VIRTUAL VISIT (OUTPATIENT)
Dept: FAMILY MEDICINE | Facility: CLINIC | Age: 31
End: 2021-01-15
Payer: COMMERCIAL

## 2021-01-15 DIAGNOSIS — F90.9 ATTENTION DEFICIT HYPERACTIVITY DISORDER (ADHD), UNSPECIFIED ADHD TYPE: Primary | ICD-10-CM

## 2021-01-15 PROCEDURE — 99214 OFFICE O/P EST MOD 30 MIN: CPT | Mod: GT | Performed by: INTERNAL MEDICINE

## 2021-01-15 RX ORDER — LISDEXAMFETAMINE DIMESYLATE 30 MG/1
30 CAPSULE ORAL DAILY
Qty: 30 CAPSULE | Refills: 0 | Status: SHIPPED | OUTPATIENT
Start: 2021-02-15 | End: 2021-03-17

## 2021-01-15 RX ORDER — LISDEXAMFETAMINE DIMESYLATE 30 MG/1
30 CAPSULE ORAL DAILY
Qty: 30 CAPSULE | Refills: 0 | Status: SHIPPED | OUTPATIENT
Start: 2021-03-18 | End: 2021-03-23 | Stop reason: ALTCHOICE

## 2021-01-15 RX ORDER — LISDEXAMFETAMINE DIMESYLATE 30 MG/1
30 CAPSULE ORAL DAILY
Qty: 30 CAPSULE | Refills: 0 | Status: SHIPPED | OUTPATIENT
Start: 2021-01-15 | End: 2021-02-14

## 2021-01-15 NOTE — PROGRESS NOTES
"Cristino is a 30 year old who is being evaluated via a billable telephone visit.      The patient has been notified of following:      \"This telephone visit will be conducted via a call between you and your physician/provider. We have found that certain health care needs can be provided without the need for a physical exam.  This service lets us provide the care you need with a short phone conversation.  If a prescription is necessary we can send it directly to your pharmacy.  If lab work is needed we can place an order for that and you can then stop by our lab to have the test done at a later time.     Telephone visits are billed at different rates depending on your insurance coverage. During this emergency period, for some insurers they may be billed the same as an in-person visit.  Please reach out to your insurance provider with any questions.     If during the course of the call the physician/provider feels a telephone visit is not appropriate, you will not be charged for this service.\"     Patient has given verbal consent for Telephone visit?  Yes     What phone number would you like to be contacted at? [phone]     How would you like to obtain your AVS? MyChart        Chief Complaint   Patient presents with     A.D.H.D     follow up         HPI:   Patient Mason Allen is a very pleasant 30 year old male with history of ADHD today for telephone visit for evaluation of poorly controlled chronic ADHD symptoms. Regarding the patient's ADHD, the patient reports that his ADHD symptoms are not well controlled currently. Patient wants to restart his previous Vyvanse ADHD medication at this time. No chest pain, headaches, fever or chills.      Current Medications:     Current Outpatient Medications   Medication Sig Dispense Refill     lisdexamfetamine (VYVANSE) 30 MG capsule Take 1 capsule (30 mg) by mouth daily 30 capsule 0     [START ON 2/15/2021] lisdexamfetamine (VYVANSE) 30 MG capsule Take 1 capsule (30 mg) by " mouth daily 30 capsule 0     [START ON 3/18/2021] lisdexamfetamine (VYVANSE) 30 MG capsule Take 1 capsule (30 mg) by mouth daily 30 capsule 0         Allergies:      Allergies   Allergen Reactions     Bees      Swelling of neck            Past Medical History:     Past Medical History:   Diagnosis Date     Blood in stool          Past Surgical History:     Past Surgical History:   Procedure Laterality Date     COLONOSCOPY N/A 10/17/2019    Procedure: COLONOSCOPY, WITH POLYPECTOMY AND BIOPSY;  Surgeon: Sahil Henderson MD;  Location:  GI     ESOPHAGOSCOPY, GASTROSCOPY, DUODENOSCOPY (EGD), COMBINED N/A 10/17/2019    Procedure: ESOPHAGOGASTRODUODENOSCOPY, WITH BIOPSY;  Surgeon: Sahil Henderson MD;  Location: [facility]           Family Medical History:     Family History   Problem Relation Age of Onset     Crohn's Disease Paternal Grandfather      Colon Cancer Paternal Grandfather          Social History:     Social History     Socioeconomic History     Marital status:      Spouse name: Not on file     Number of children: Not on file     Years of education: Not on file     Highest education level: Not on file   Occupational History     Not on file   Social Needs     Financial resource strain: Not on file     Food insecurity     Worry: Not on file     Inability: Not on file     Transportation needs     Medical: Not on file     Non-medical: Not on file   Tobacco Use     Smoking status: Never Smoker     Smokeless tobacco: Never Used   Substance and Sexual Activity     Alcohol use: Yes     Alcohol/week: 0.0 standard drinks     Comment: one drink a week     Drug use: Never     Sexual activity: Yes     Partners: Female   Lifestyle     Physical activity     Days per week: Not on file     Minutes per session: Not on file     Stress: Not on file   Relationships     Social connections     Talks on phone: Not on file     Gets together: Not on file     Attends Rastafari service: Not on file     Active  member of club or organization: Not on file     Attends meetings of clubs or organizations: Not on file     Relationship status: Not on file     Intimate partner violence     Fear of current or ex partner: Not on file     Emotionally abused: Not on file     Physically abused: Not on file     Forced sexual activity: Not on file   Other Topics Concern     Parent/sibling w/ CABG, MI or angioplasty before 65F 55M? Not Asked   Social History Narrative    .  Works in [employer].            Review of System:     Constitutional: Negative for fever or chills  Skin: Negative for rashes  Ears/Nose/Throat: Negative for nasal congestion, sore throat  Respiratory: No shortness of breath, dyspnea on exertion, cough, or hemoptysis  Cardiovascular: Negative for chest pain  Gastrointestinal: Negative for nausea, vomiting  Genitourinary: Negative for dysuria, hematuria  Musculoskeletal: Negative for myalgias  Neurologic: Negative for headaches  Psychiatric: positive for ADHD  Hematologic/Lymphatic/Immunologic: Negative  Endocrine: Negative  Behavioral: Negative for tobacco use       Physical Exam:   There were no vitals taken for this visit.    RESP: no cough over the phone  NEURO: Alert & Oriented x 3 over the phone.   PSYCH: mentation appears normal, affect normal over the phone        Diagnostic Test Results:     Diagnostic Test Results:  Labs reviewed in Epic    ASSESSMENT/PLAN:       (F90.9) Attention deficit hyperactivity disorder (ADHD), unspecified ADHD type  (primary encounter diagnosis)  Comment: not well controlled. Patient wants to restart his previous Vyvanse ADHD medication at this time.  Plan: lisdexamfetamine (VYVANSE) 30 MG capsule          Follow Up Plan:     Patient is instructed to return to Internal Medicine clinic for follow-up visit in 3 months.      Phone call duration: 25 minutes      Francine Alejandre MD  Internal Medicine  [facility]

## 2021-02-11 DIAGNOSIS — G47.33 OSA (OBSTRUCTIVE SLEEP APNEA): ICD-10-CM

## 2021-03-10 ENCOUNTER — E-VISIT (OUTPATIENT)
Dept: URGENT CARE | Facility: URGENT CARE | Age: 31
End: 2021-03-10
Payer: COMMERCIAL

## 2021-03-10 ENCOUNTER — OFFICE VISIT (OUTPATIENT)
Dept: URGENT CARE | Facility: URGENT CARE | Age: 31
End: 2021-03-10
Attending: PHYSICIAN ASSISTANT
Payer: COMMERCIAL

## 2021-03-10 DIAGNOSIS — Z20.822 SUSPECTED COVID-19 VIRUS INFECTION: Primary | ICD-10-CM

## 2021-03-10 DIAGNOSIS — Z20.822 SUSPECTED COVID-19 VIRUS INFECTION: ICD-10-CM

## 2021-03-10 LAB
LABORATORY COMMENT REPORT: NORMAL
SARS-COV-2 RNA RESP QL NAA+PROBE: NEGATIVE
SARS-COV-2 RNA RESP QL NAA+PROBE: NORMAL
SPECIMEN SOURCE: NORMAL
SPECIMEN SOURCE: NORMAL

## 2021-03-10 PROCEDURE — 99207 PR NO CHARGE LOS: CPT

## 2021-03-10 PROCEDURE — 99421 OL DIG E/M SVC 5-10 MIN: CPT | Performed by: PHYSICIAN ASSISTANT

## 2021-03-10 PROCEDURE — 87635 SARS-COV-2 COVID-19 AMP PRB: CPT | Performed by: PHYSICIAN ASSISTANT

## 2021-03-10 NOTE — PATIENT INSTRUCTIONS
Dear Mason Allen,    Your symptoms show that you may have coronavirus (COVID-19). This illness can cause fever, cough and trouble breathing. Many people get a mild case and get better on their own. Some people can get very sick.    Will I be tested for COVID-19?  We would like to test you for Covid-19 virus. I have placed orders for this test.     To schedule: go to your [employer] home page and scroll down to the section that says  You have an appointment that needs to be scheduled  and click the large green button that says  Schedule Now  and follow the steps to find the next available openings.    If you are unable to complete these [employer] scheduling steps, please call [phone] to schedule your testing.     Return to work/school/ guidance:  Please let your workplace manager and staffing office know when your quarantine ends     We can t give you an exact date as it depends on the above. You can calculate this on your own or work with your manager/staffing office to calculate this. (For example if you were exposed on 10/4, you would have to quarantine for 14 full days. That would be through 10/18. You could return on 10/19.)      If you receive a positive COVID-19 test result, follow the guidance of the those who are giving you the results. Usually the return to work is 10 (or in some cases 20 days from symptom onset.) If you work at [facility], you must also be cleared by Employee Occupational Health and Safety to return to work.        If you receive a negative COVID-19 test result and did not have a high risk exposure to someone with a known positive COVID-19 test, you can return to work once you're free of fever for 24 hours without fever-reducing medication and your symptoms are improving or resolved.      If you receive a negative COVID-19 test and If you had a high risk exposure to someone who has tested positive for COVID-19 then you can return to work 14 days after your last contact with  the positive individual    Note: If you have ongoing exposure to the covid positive person, this quarantine period may be more than 14 days. (For example, if you are continued to be exposed to your child who tested positive and cannot isolate from them, then the quarantine of 7-14 days can't start until your child is no longer contagious. This is typically 10 days from onset of the child's symptoms. So the total duration may be 17-24 days in this case.)    Sign up for [employer]â„¢.   We know it's scary to hear that you might have COVID-19. We want to track your symptoms to make sure you're okay over the next 2 weeks. Please look for an email from [employer]â„¢--this is a free, online program that we'll use to keep in touch. To sign up, follow the link in the email you will receive. Learn more at [email or web address]    How can I take care of myself?    Get lots of rest. Drink extra fluids (unless a doctor has told you not to)    Take Tylenol (acetaminophen) or ibuprofen for fever or pain. If you have liver or kidney problems, ask your family doctor if it's okay to take Tylenol o ibuprofen    If you have other health problems (like cancer, heart failure, an organ transplant or severe kidney disease): Call your specialty clinic if you don't feel better in the next 2 days.    Know when to call 911. Emergency warning signs include:  o Trouble breathing or shortness of breath  o Pain or pressure in the chest that doesn't go away  o Feeling confused like you haven't felt before, or not being able to wake up  o Bluish-colored lips or face    Where can I get more information?  [facility] [place] - About COVID-19:   [email or web address]    CDC - What to Do If You're Sick:   www.cdc.gov/coronavirus/2019-ncov/about/steps-when-sick.html

## 2021-03-21 ENCOUNTER — E-VISIT (OUTPATIENT)
Dept: FAMILY MEDICINE | Facility: CLINIC | Age: 31
End: 2021-03-21
Payer: COMMERCIAL

## 2021-03-21 DIAGNOSIS — H65.191 OTHER ACUTE NONSUPPURATIVE OTITIS MEDIA OF RIGHT EAR, RECURRENCE NOT SPECIFIED: Primary | ICD-10-CM

## 2021-03-21 PROCEDURE — 99423 OL DIG E/M SVC 21+ MIN: CPT | Performed by: INTERNAL MEDICINE

## 2021-03-22 RX ORDER — PREDNISONE 20 MG/1
20 TABLET ORAL 2 TIMES DAILY
Qty: 10 TABLET | Refills: 1 | Status: SHIPPED | OUTPATIENT
Start: 2021-03-22 | End: 2021-03-27

## 2021-03-22 RX ORDER — AZITHROMYCIN 250 MG/1
TABLET, FILM COATED ORAL
Qty: 6 TABLET | Refills: 1 | Status: SHIPPED | OUTPATIENT
Start: 2021-03-22 | End: 2021-03-27

## 2021-03-22 NOTE — TELEPHONE ENCOUNTER
Provider E-Visit time total (minutes): 25 minutes    Chief Complaint:     Ear pain    HPI:     Ear Pain    Duration:     Since: recent            Specific cause: none    Description:      Location of pain: middle ear     Character of pain: sharp     Pain radiation: none    Intensity:  worsening    History:      Pain interferes with job: yes     History of similar pain problems: no     Any previous MRI or X-rays: none     Therapies tried without relief: none    Alleviating factors:      Improved by: none    Precipitating factors:    Worsened by: none    Accompanying Signs & Symptoms: none            Current Medications:     Current Outpatient Medications   Medication Sig Dispense Refill     azithromycin (ZITHROMAX) 250 MG tablet Take 2 tablets (500 mg) by mouth daily for 1 day, THEN 1 tablet (250 mg) daily for 4 days. 6 tablet 1     predniSONE (DELTASONE) 20 MG tablet Take 1 tablet (20 mg) by mouth 2 times daily for 5 days 10 tablet 1     lisdexamfetamine (VYVANSE) 30 MG capsule Take 1 capsule (30 mg) by mouth daily 30 capsule 0         Allergies:      Allergies   Allergen Reactions     Bees      Swelling of neck            Past Medical History:     Past Medical History:   Diagnosis Date     Blood in stool          Past Surgical History:     Past Surgical History:   Procedure Laterality Date     COLONOSCOPY N/A 10/17/2019    Procedure: COLONOSCOPY, WITH POLYPECTOMY AND BIOPSY;  Surgeon: Sahil Henderson MD;  Location: [facility]     ESOPHAGOSCOPY, GASTROSCOPY, DUODENOSCOPY (EGD), COMBINED N/A 10/17/2019    Procedure: ESOPHAGOGASTRODUODENOSCOPY, WITH BIOPSY;  Surgeon: Sahil Henderson MD;  Location: [facility]           Family Medical History:     Family History   Problem Relation Age of Onset     Crohn's Disease Paternal Grandfather      Colon Cancer Paternal Grandfather          Social History:     Social History     Socioeconomic History     Marital status:      Spouse name: Not on file      Number of children: Not on file     Years of education: Not on file     Highest education level: Not on file   Occupational History     Not on file   Social Needs     Financial resource strain: Not on file     Food insecurity     Worry: Not on file     Inability: Not on file     Transportation needs     Medical: Not on file     Non-medical: Not on file   Tobacco Use     Smoking status: Never Smoker     Smokeless tobacco: Never Used   Substance and Sexual Activity     Alcohol use: Yes     Alcohol/week: 0.0 standard drinks     Comment: one drink a week     Drug use: Never     Sexual activity: Yes     Partners: Female   Lifestyle     Physical activity     Days per week: Not on file     Minutes per session: Not on file     Stress: Not on file   Relationships     Social connections     Talks on phone: Not on file     Gets together: Not on file     Attends Roman Catholic service: Not on file     Active member of club or organization: Not on file     Attends meetings of clubs or organizations: Not on file     Relationship status: Not on file     Intimate partner violence     Fear of current or ex partner: Not on file     Emotionally abused: Not on file     Physically abused: Not on file     Forced sexual activity: Not on file   Other Topics Concern     Parent/sibling w/ CABG, MI or angioplasty before 65F 55M? Not Asked   Social History Narrative    .  Works in [employer].            Review of System:     Constitutional: Negative for fever or chills  Skin: Negative for rashes  Ears/Nose/Throat: Negative for nasal congestion, sore throat, positive for ear infection ear pain symptoms  Respiratory: No shortness of breath, dyspnea on exertion, cough, or hemoptysis  Cardiovascular: Negative for chest pain  Gastrointestinal: Negative for nausea, vomiting  Genitourinary: Negative for dysuria, hematuria  Musculoskeletal: Negative for myalgias  Neurologic: Negative for headaches  Psychiatric: Negative for depression,  anxiety  Hematologic/Lymphatic/Immunologic: Negative  Endocrine: Negative  Behavioral: Negative for tobacco use       Physical Exam:   There were no vitals taken for this visit.        Diagnostic Test Results:     Diagnostic Test Results:  Labs reviewed in Epic    ASSESSMENT/PLAN:       (H65.191) Other acute nonsuppurative otitis media of right ear, recurrence not specified  (primary encounter diagnosis)  Comment: ear infection symptoms  Plan: predniSONE (DELTASONE) 20 MG tablet,         azithromycin (ZITHROMAX) 250 MG tablet        Follow Up Plan:     Patient is instructed to return to Internal Medicine clinic for follow-up visit in 1 week.        Francine Alejandre MD  Internal Medicine  [facility]

## 2021-04-07 ENCOUNTER — OFFICE VISIT (OUTPATIENT)
Dept: FAMILY MEDICINE | Facility: CLINIC | Age: 31
End: 2021-04-07
Payer: COMMERCIAL

## 2021-04-07 VITALS
BODY MASS INDEX: 37.08 KG/M2 | SYSTOLIC BLOOD PRESSURE: 141 MMHG | DIASTOLIC BLOOD PRESSURE: 91 MMHG | HEART RATE: 88 BPM | OXYGEN SATURATION: 98 % | WEIGHT: 259 LBS | TEMPERATURE: 96.9 F | HEIGHT: 70 IN

## 2021-04-07 DIAGNOSIS — H65.492 OTHER CHRONIC NONSUPPURATIVE OTITIS MEDIA OF LEFT EAR: Primary | ICD-10-CM

## 2021-04-07 DIAGNOSIS — J30.2 SEASONAL ALLERGIC RHINITIS, UNSPECIFIED TRIGGER: ICD-10-CM

## 2021-04-07 DIAGNOSIS — F41.1 GAD (GENERALIZED ANXIETY DISORDER): ICD-10-CM

## 2021-04-07 DIAGNOSIS — F90.9 ATTENTION DEFICIT HYPERACTIVITY DISORDER (ADHD), UNSPECIFIED ADHD TYPE: ICD-10-CM

## 2021-04-07 PROCEDURE — 99214 OFFICE O/P EST MOD 30 MIN: CPT | Performed by: INTERNAL MEDICINE

## 2021-04-07 RX ORDER — PREDNISONE 20 MG/1
20 TABLET ORAL 2 TIMES DAILY
Qty: 20 TABLET | Refills: 1 | Status: SHIPPED | OUTPATIENT
Start: 2021-04-07 | End: 2021-04-17

## 2021-04-07 RX ORDER — AZITHROMYCIN 250 MG/1
TABLET, FILM COATED ORAL
Qty: 6 TABLET | Refills: 1 | Status: SHIPPED | OUTPATIENT
Start: 2021-04-07 | End: 2021-04-12

## 2021-04-07 RX ORDER — AMOXICILLIN 500 MG/1
CAPSULE ORAL
COMMUNITY
Start: 2020-12-03 | End: 2022-05-02

## 2021-04-07 ASSESSMENT — MIFFLIN-ST. JEOR: SCORE: 2141.07

## 2021-04-07 NOTE — PROGRESS NOTES
Leo Gregg is a 31 year old who presents for the following health issues     HPI       Chief Complaint:     Ear pain    HPI:     Left Ear Pain    Duration:     Since: recent            Specific cause: none    Description:      Location of pain: middle ear     Character of pain: sharp     Pain radiation: none    Intensity:  worsening    History:      Pain interferes with job: yes     History of similar pain problems: no     Any previous MRI or X-rays: none     Therapies tried without relief: none    Alleviating factors:      Improved by: none    Precipitating factors:    Worsened by: none    Accompanying Signs & Symptoms: allergic rhinitis flare up symptoms            Current Medications:     Current Outpatient Medications   Medication Sig Dispense Refill     amoxicillin (AMOXIL) 500 MG capsule TK 1 C PO TID UNTIL GONE       azithromycin (ZITHROMAX) 250 MG tablet Take 2 tablets (500 mg) by mouth daily for 1 day, THEN 1 tablet (250 mg) daily for 4 days. 6 tablet 1     predniSONE (DELTASONE) 20 MG tablet Take 1 tablet (20 mg) by mouth 2 times daily for 10 days 20 tablet 1         Allergies:      Allergies   Allergen Reactions     Bees      Swelling of neck            Past Medical History:     Past Medical History:   Diagnosis Date     Blood in stool          Past Surgical History:     Past Surgical History:   Procedure Laterality Date     COLONOSCOPY N/A 10/17/2019    Procedure: COLONOSCOPY, WITH POLYPECTOMY AND BIOPSY;  Surgeon: Sahil Henderson MD;  Location:  [facility]     ESOPHAGOSCOPY, GASTROSCOPY, DUODENOSCOPY (EGD), COMBINED N/A 10/17/2019    Procedure: ESOPHAGOGASTRODUODENOSCOPY, WITH BIOPSY;  Surgeon: Sahil Henderson MD;  Location: [facility]           Family Medical History:     Family History   Problem Relation Age of Onset     Crohn's Disease Paternal Grandfather      Colon Cancer Paternal Grandfather          Social History:     Social History     Socioeconomic History      Marital status:      Spouse name: Not on file     Number of children: Not on file     Years of education: Not on file     Highest education level: Not on file   Occupational History     Not on file   Social Needs     Financial resource strain: Not on file     Food insecurity     Worry: Not on file     Inability: Not on file     Transportation needs     Medical: Not on file     Non-medical: Not on file   Tobacco Use     Smoking status: Never Smoker     Smokeless tobacco: Never Used   Substance and Sexual Activity     Alcohol use: Yes     Alcohol/week: 0.0 standard drinks     Comment: one drink a week     Drug use: Never     Sexual activity: Yes     Partners: Female   Lifestyle     Physical activity     Days per week: Not on file     Minutes per session: Not on file     Stress: Not on file   Relationships     Social connections     Talks on phone: Not on file     Gets together: Not on file     Attends Orthodox service: Not on file     Active member of club or organization: Not on file     Attends meetings of clubs or organizations: Not on file     Relationship status: Not on file     Intimate partner violence     Fear of current or ex partner: Not on file     Emotionally abused: Not on file     Physically abused: Not on file     Forced sexual activity: Not on file   Other Topics Concern     Parent/sibling w/ CABG, MI or angioplasty before 65F 55M? Not Asked   Social History Narrative    .  Works in [employer].            Review of System:     Constitutional: Negative for fever or chills  Skin: Negative for rashes  Ears/Nose/Throat: Negative for nasal congestion, sore throat, positive for ear infection ear pain symptoms  Respiratory: No shortness of breath, dyspnea on exertion, cough, or hemoptysis  Cardiovascular: Negative for chest pain  Gastrointestinal: Negative for nausea, vomiting  Genitourinary: Negative for dysuria, hematuria  Musculoskeletal: Negative for myalgias  Neurologic: Negative for  "headaches  Psychiatric: positive for history of ADHD, anxiety  Hematologic/Lymphatic/Immunologic: Negative  Endocrine: Negative  Behavioral: Negative for tobacco use       Physical Exam:   BP (!) 141/91 (BP Location: Right arm, Patient Position: Sitting, Cuff Size: Adult Large)   Pulse 88   Temp 96.9  F (36.1  C) (Temporal)   Ht 1.778 m (5' 10\")   Wt 117.5 kg (259 lb)   SpO2 98%   BMI 37.16 kg/m      GENERAL: alert and no distress  EYES: eyes grossly normal to inspection, and conjunctivae and sclerae normal  HENT: Normocephalic atraumatic. Nose and mouth without ulcers or lesions, left ear middle ear infection symptoms noted  NECK: supple  RESP: lungs clear to auscultation   CV: regular rate and rhythm, normal S1 S2  LYMPH: no peripheral edema   ABDOMEN: nondistended  MS: no gross musculoskeletal defects noted  SKIN: no suspicious lesions or rashes  NEURO: Alert & Oriented x 3.   PSYCH: mentation appears normal, affect normal    Diagnostic Test Results:     Diagnostic Test Results:  Labs reviewed in Epic    ASSESSMENT/PLAN:       (J30.2) Seasonal allergic rhinitis, unspecified trigger  (H65.492) Other chronic nonsuppurative otitis media of left ear  (primary encounter diagnosis)  Comment: ear infection in the setting of allergic rhinitis flare up symptoms  Plan: azithromycin (ZITHROMAX) 250 MG tablet,         predniSONE (DELTASONE) 20 MG tablet,         OTOLARYNGOLOGY REFERRAL      (F90.9) Attention deficit hyperactivity disorder (ADHD), unspecified ADHD type  (F41.1) JONATHON (generalized anxiety disorder)  Comment: stable  Plan: continue current therapy      Follow Up Plan:     Patient is instructed to return to Internal Medicine clinic for follow-up visit in 1 week.        Francine Alejandre MD  Internal Medicine  [facility]    "

## 2021-04-12 PROBLEM — E66.01 MORBID OBESITY (H): Status: ACTIVE | Noted: 2021-04-12

## 2021-04-24 ENCOUNTER — HEALTH MAINTENANCE LETTER (OUTPATIENT)
Age: 31
End: 2021-04-24

## 2021-08-19 ENCOUNTER — MYC MEDICAL ADVICE (OUTPATIENT)
Dept: FAMILY MEDICINE | Facility: CLINIC | Age: 31
End: 2021-08-19

## 2021-08-19 DIAGNOSIS — H92.01 RIGHT EAR PAIN: Primary | ICD-10-CM

## 2021-08-27 ENCOUNTER — TRANSFERRED RECORDS (OUTPATIENT)
Dept: HEALTH INFORMATION MANAGEMENT | Facility: CLINIC | Age: 31
End: 2021-08-27

## 2021-08-27 ENCOUNTER — LAB REQUISITION (OUTPATIENT)
Dept: LAB | Facility: CLINIC | Age: 31
End: 2021-08-27
Payer: COMMERCIAL

## 2021-08-27 DIAGNOSIS — H69.83 OTHER SPECIFIED DISORDERS OF EUSTACHIAN TUBE, BILATERAL: ICD-10-CM

## 2021-08-27 PROCEDURE — 87186 SC STD MICRODIL/AGAR DIL: CPT | Mod: ORL | Performed by: OTOLARYNGOLOGY

## 2021-08-30 LAB
BACTERIA SINUS CULT: ABNORMAL
BACTERIA SINUS CULT: ABNORMAL

## 2021-10-04 ENCOUNTER — HEALTH MAINTENANCE LETTER (OUTPATIENT)
Age: 31
End: 2021-10-04

## 2022-05-02 ENCOUNTER — VIRTUAL VISIT (OUTPATIENT)
Dept: SLEEP MEDICINE | Facility: CLINIC | Age: 32
End: 2022-05-02
Payer: COMMERCIAL

## 2022-05-02 VITALS — HEIGHT: 71 IN | WEIGHT: 270 LBS | BODY MASS INDEX: 37.8 KG/M2

## 2022-05-02 DIAGNOSIS — G47.33 OSA (OBSTRUCTIVE SLEEP APNEA): Primary | ICD-10-CM

## 2022-05-02 PROCEDURE — 99213 OFFICE O/P EST LOW 20 MIN: CPT | Mod: 95 | Performed by: PHYSICIAN ASSISTANT

## 2022-05-02 ASSESSMENT — SLEEP AND FATIGUE QUESTIONNAIRES
HOW LIKELY ARE YOU TO NOD OFF OR FALL ASLEEP WHILE SITTING QUIETLY AFTER LUNCH WITHOUT ALCOHOL: SLIGHT CHANCE OF DOZING
HOW LIKELY ARE YOU TO NOD OFF OR FALL ASLEEP WHILE SITTING INACTIVE IN A PUBLIC PLACE: WOULD NEVER DOZE
HOW LIKELY ARE YOU TO NOD OFF OR FALL ASLEEP WHILE LYING DOWN TO REST IN THE AFTERNOON WHEN CIRCUMSTANCES PERMIT: HIGH CHANCE OF DOZING
HOW LIKELY ARE YOU TO NOD OFF OR FALL ASLEEP WHILE SITTING AND TALKING TO SOMEONE: WOULD NEVER DOZE
HOW LIKELY ARE YOU TO NOD OFF OR FALL ASLEEP IN A CAR, WHILE STOPPED FOR A FEW MINUTES IN TRAFFIC: WOULD NEVER DOZE
HOW LIKELY ARE YOU TO NOD OFF OR FALL ASLEEP WHILE SITTING AND READING: SLIGHT CHANCE OF DOZING
HOW LIKELY ARE YOU TO NOD OFF OR FALL ASLEEP WHILE WATCHING TV: SLIGHT CHANCE OF DOZING
HOW LIKELY ARE YOU TO NOD OFF OR FALL ASLEEP WHEN YOU ARE A PASSENGER IN A CAR FOR AN HOUR WITHOUT A BREAK: WOULD NEVER DOZE

## 2022-05-02 NOTE — NURSING NOTE
Auto Cpap pressure changed from original settings of 5 Min - 15 Max to new settings of 7 Min - 16 Max.      1 year reminder sent to pt via [employer]. Marked to send 1 month before follow up due.    VAN Tavares

## 2022-05-02 NOTE — PROGRESS NOTES
"[facility]   Outpatient Sleep Medicine  May 2, 2022       Name: Mason Allen MRN# [id number]   Age: 32 year old YOB: 1990            Assessment and Plan:   1. MOLLY (obstructive sleep apnea)  Patient's sleep apnea is adequately managed and well treated with current PAP settings [id number] with low residual AHI of 2.19 events per hour. Compliance is excellent. Tolerating PAP well. Daytime symptoms and nighttime sleep quality are improved with use. Slight pressure change made today for patient comfort to [id number]. Prescription renewed for mask/supplies. Will continue nightly therapy.   - Comprehensive DME    Mason Allen will follow up in about 1-2 years, or sooner as needed.        Chief Complaint      Chief Complaint   Patient presents with     CPAP Follow Up     Renewing prescription          History of Present Illness:     Mason Allen is a 32 year old male who presents to the clinic for follow-up of their severe obstructive sleep apnea treated with CPAP. Other past medical history includes allergic rhinitis, ADHD, JONATHON, obesity.     Originally diagnosed via [facility]ST on 1/21/2020 (254#, BMI 36.45) showing AHI 53.5, supine AHI 88.8, oxygen sheila 82%, 12.2 minutes spent <=88%    Last seen 3/30/2020. Returns today for routine follow-up. In need of new mask/supplies prescription. States CPAP has been \"amazing just the best quality of life improvement ever\".     Patient is using a nasal pillow mask. The mask is comfortable. The mask is not leaking. They are very rarely snoring with the mask on. They are not having gasp arousals. They are not having significant oral/nasal dryness, can have occasional epistaxis in winter months where he will add humidity for.  They are not having pain/skin breakdown. The pressure settings are comfortable but no longer feeling as strong as it once was, very mild air hunger at times.     Bedtime is typically sometime between 11-12:00AM. Usually it takes " "about 30 minutes or less to fall asleep with the mask on. Wake time is typically between 7:30-8:30AM.  Patient is using PAP therapy 7.5-8 hours per night.    ResMed Auto-PAP 5.0 - 15.0 cmH2O 30 day usage data:    100% of days with > 4 hours of use. 0/30 days with no use.   Average use 467 minutes per day.   95%ile Leak 0.43 L/min.   CPAP 95% pressure 12.8 cm.   AHI 2.19 events per hour.     SCALES:   INSOMNIA: Insomnia Severity Score: 5   SLEEPINESS: [place] Sleepiness Score: 6    Past medical/surgical history, family history, social history, medications and allergies were reviewed.           Physical Examination:   Ht 1.791 m (5' 10.5\")   Wt 122.5 kg (270 lb)   BMI 38.19 kg/m    General appearance: Awake, alert, cooperative. Well groomed. Sitting comfortably in chair. In no apparent distress.  HEENT: Head: Normocephalic, atraumatic. Eyes:Conjunctiva clear. Sclera normal. Nose: External appearance without deformity.   Pulmonary:  Able to speak easily in full sentences. No cough or wheeze.   Skin:  No rashes or significant lesions on visible skin.   Neurologic: Alert, oriented x3.   Psychiatric: Mood euthymic. Affect congruent with full range and intensity.      CC:  Francine Alejandre PA-C  May 2, 2022     [facility]  [address] [place] [address]     [facility]  [address] [place] [id number]    Chart documentation was completed, in part, with [employer] voice-recognition software. Even though reviewed, some grammatical, spelling, and word errors may remain.      22 minutes spent on day of encounter doing chart review, history and exam, documentation, and further activities as noted above    "

## 2022-05-02 NOTE — PROGRESS NOTES
Mason is a 32 year old who is being evaluated via a billable video visit.      How would you like to obtain your AVS? MyChart  If the video visit is dropped, the invitation should be resent by: Send to e-mail at: [email or web address]  Will anyone else be joining your video visit? Fani Lyn    Video-Visit Details    Type of service:  Video Visit  Video Start Time: 10:37AM    Video End Time:10:51AM    Originating Location (pt. Location): Home    Distant Location (provider location):  [facility]     Platform used for Video Visit: Isaac Manzanares PA-C

## 2022-05-15 ENCOUNTER — HEALTH MAINTENANCE LETTER (OUTPATIENT)
Age: 32
End: 2022-05-15

## 2022-09-11 ENCOUNTER — HEALTH MAINTENANCE LETTER (OUTPATIENT)
Age: 32
End: 2022-09-11

## 2022-12-21 NOTE — PROGRESS NOTES
Chief Complaint:       Mason Allen is a 29 year old male who presents to clinic today for the following health issues:      Chief Complaint   Patient presents with     Establish Care     Follow Up on blood in the stool symptoms     discuss chronic snoring symptoms concerning for MOLLY         HPI:   Patient Mason Allen is a very pleasant 29 year old male with history of chronic snoring and family history of colon cancer and crohn's disease who presents to Internal Medicine clinic today for evaluation of recent episodes of blood in the stool. Regarding the patient's family medical history, the patient reports that his paternal grandfather was diagnosed with crohn's disease and colon cancer in the past. Regarding the patient's chronic snoring symptoms, the patient complains of chronic snoring symptoms for many years and is interested in an evaluation by the [facility] going forward. No chest pain, headaches, fever or chills.       Current Medications:     Current Outpatient Medications   Medication Sig Dispense Refill     albuterol (PROAIR HFA, PROVENTIL HFA, VENTOLIN HFA) 108 (90 BASE) MCG/ACT inhaler Inhale 2 puffs into the lungs daily as needed for shortness of breath / dyspnea or wheezing (use prior to exercise) 3 Inhaler 3     lisdexamfetamine (VYVANSE) 40 MG capsule Take 1 capsule (40 mg) by mouth every morning (Patient taking differently: Take 40 mg by mouth daily as needed ) 30 capsule 0     omeprazole (PRILOSEC) 20 MG DR capsule Take 1 capsule (20 mg) by mouth daily (Patient taking differently: Take 20 mg by mouth daily as needed ) 30 capsule 1         Allergies:      Allergies   Allergen Reactions     Bees      Swelling of neck            Past Medical History:     Past Medical History:   Diagnosis Date     Blood in stool          Past Surgical History:   No past surgical history on file.      Family Medical History:     Family History   Problem Relation Age of Onset     Crohn's Disease  HPI     Date of Service:  12/21/2022    Patient:  Sonia Sr    Chief Complaint:  Fall and Knee Pain       HPI:  Sonia Sr is a 81 y.o.  female with a past medical history of hypertension, diabetes, hyperlipidemia who presents for evaluation of a fall. Patient reports she was walking in a parking lot when she missed stepped and fell. She fell onto her right leg. States she did hit her head but no LOC. No blood thinner use. Patient is primarily complaining of pain in her right hip, femur and knee. Has chronic neck pain which is at baseline. No back pain. Patient was in her normal state of health prior to the fall. No recent illness. Past Medical History:   Diagnosis Date    Arthritis     Chronic pain     Diabetes ([facility] [address]     Essential hypertension, malignant 4/6/2011    Hypertension     MI, old 2017    Pre-operative cardiovascular examination 4/6/2011    Psychiatric disorder     DEPRESSION/ANXIETY    PUD (peptic ulcer disease)     1970S    Pure hypercholesterolemia 4/6/2011    Type II or unspecified type diabetes mellitus without mention of complication, not stated as uncontrolled 4/6/2011       Past Surgical History:   Procedure Laterality Date    HX DILATION AND CURETTAGE      HX HEENT      EARS AS CHILD    HX SALPINGO-OOPHORECTOMY      HX TONSILLECTOMY      NEUROLOGICAL PROCEDURE UNLISTED      CERV.  FUSION    NJ ABDOMEN SURGERY PROC UNLISTED      BOWEL OBSTRUCTION X2    NJ ABDOMEN SURGERY PROC UNLISTED      GASTRIC BYPASS    NJ ABDOMEN SURGERY PROC UNLISTED      LAP SEBASTIÁN    NJ ABDOMEN SURGERY PROC UNLISTED      HERNIA          Family History:   Problem Relation Age of Onset    Diabetes Mother     Hypertension Mother     Heart Disease Mother     Hypertension Father     Heart Disease Father     Cancer Father     Diabetes Father     Hypertension Sister     Hypertension Brother     Cancer Paternal Grandmother     Cancer Paternal Grandfather     Hypertension Sister     Cancer Sister Paternal Grandfather      Colon Cancer Paternal Grandfather          Social History:     Social History     Socioeconomic History     Marital status:      Spouse name: Not on file     Number of children: Not on file     Years of education: Not on file     Highest education level: Not on file   Occupational History     Not on file   Social Needs     Financial resource strain: Not on file     Food insecurity:     Worry: Not on file     Inability: Not on file     Transportation needs:     Medical: Not on file     Non-medical: Not on file   Tobacco Use     Smoking status: Never Smoker     Smokeless tobacco: Never Used   Substance and Sexual Activity     Alcohol use: Yes     Alcohol/week: 0.0 standard drinks     Comment: one drink a week     Drug use: Never     Sexual activity: Yes     Partners: Female   Lifestyle     Physical activity:     Days per week: Not on file     Minutes per session: Not on file     Stress: Not on file   Relationships     Social connections:     Talks on phone: Not on file     Gets together: Not on file     Attends Hoahaoism service: Not on file     Active member of club or organization: Not on file     Attends meetings of clubs or organizations: Not on file     Relationship status: Not on file     Intimate partner violence:     Fear of current or ex partner: Not on file     Emotionally abused: Not on file     Physically abused: Not on file     Forced sexual activity: Not on file   Other Topics Concern     Not on file   Social History Narrative    .  Works in graphic design.            Review of System:     Constitutional: Negative for fever or chills  Skin: Negative for rashes  Ears/Nose/Throat: Negative for nasal congestion, sore throat, positive for chronic snoring concerning for MOLLY  Respiratory: No shortness of breath, dyspnea on exertion, cough, or hemoptysis  Cardiovascular: Negative for chest pain  Gastrointestinal: Negative for nausea, vomiting  Genitourinary: Negative for  Social History     Socioeconomic History    Marital status:      Spouse name: Not on file    Number of children: Not on file    Years of education: Not on file    Highest education level: Not on file   Occupational History    Not on file   Tobacco Use    Smoking status: Never    Smokeless tobacco: Never   Substance and Sexual Activity    Alcohol use: Yes     Comment: RARE    Drug use: No    Sexual activity: Never   Other Topics Concern    Not on file   Social History Narrative    ** Merged History Encounter **          Social Determinants of Health     Financial Resource Strain: Not on file   Food Insecurity: Not on file   Transportation Needs: Not on file   Physical Activity: Not on file   Stress: Not on file   Social Connections: Not on file   Intimate Partner Violence: Not on file   Housing Stability: Not on file         ALLERGIES: Actonel [risedronate] and Penicillin g    Review of Systems   Constitutional:  Negative for chills and fever. HENT:  Negative for congestion and rhinorrhea. Eyes:  Negative for discharge and redness. Respiratory:  Negative for cough and shortness of breath. Cardiovascular:  Negative for chest pain. Gastrointestinal:  Negative for abdominal pain, diarrhea, nausea and vomiting. Musculoskeletal:  Positive for arthralgias, gait problem and neck pain. Negative for back pain. Skin:  Negative for rash and wound. Neurological:  Negative for syncope, speech difficulty, weakness and numbness. Psychiatric/Behavioral:  Negative for agitation and confusion. Vitals:    12/21/22 1735 12/21/22 1736 12/21/22 1738 12/21/22 1745   BP:  (!) 230/80 (!) 226/83 (!) 225/79   Pulse: 61      Resp: 16      Temp:       SpO2: 98%               Physical Exam  Vitals and nursing note reviewed. Constitutional:       General: She is in acute distress. Appearance: Normal appearance. She is not toxic-appearing. HENT:      Head: Normocephalic and atraumatic.    Eyes: "dysuria, hematuria, positive for recent blood in the stool symptoms  Musculoskeletal: Negative for myalgias  Neurologic: Negative for headaches  Psychiatric: Negative for depression, anxiety  Hematologic/Lymphatic/Immunologic: Negative  Endocrine: Negative  Behavioral: Negative for tobacco use       Physical Exam:   /81 (BP Location: Left arm, Patient Position: Sitting, Cuff Size: Adult Large)   Pulse 96   Temp 97.9  F (36.6  C) (Oral)   Ht 1.778 m (5' 10\")   Wt 112.5 kg (248 lb)   SpO2 98%   BMI 35.58 kg/m      GENERAL: alert and no distress  EYES: eyes grossly normal to inspection, and conjunctivae and sclerae normal  HENT: Normocephalic atraumatic. Nose and mouth without ulcers or lesions  NECK: supple  RESP: lungs clear to auscultation   CV: regular rate and rhythm, normal S1 S2  LYMPH: no peripheral edema   ABDOMEN: nondistended  MS: no gross musculoskeletal defects noted  SKIN: no suspicious lesions or rashes  NEURO: Alert & Oriented x 3.   PSYCH: mentation appears normal, affect normal        Diagnostic Test Results:     Diagnostic Test Results:  Results for orders placed or performed during the hospital encounter of 09/19/19   US Abdomen Complete    Narrative    ABDOMINAL ULTRASOUND      PROCEDURE DATE:   9/19/2019 12:52 PM     HISTORY:    Epigastric pain; Hematochezia    COMPARISON:   None.    FINDINGS:    Gallbladder: Nondistended without wall thickening or pericholecystic  abnormality. Negative sonographic Salinas's sign.    Bile ducts:  No intra or extrahepatic biliary ductal dilatation.  Common bile duct within normal limits.    Liver:  Increased hepatic echogenicity with loss of normal echogenic  periportal fat. No focal mass.    Pancreas:  Portions of the head and tail are partially obscured by  gas.  Otherwise unremarkable    Spleen:  Unremarkable, measures 12.3 cm craniocaudal.    Right kidney:  Measures 12.3 cm. Cortical thickness is 1.4 cm. Cortical echogenicity  is normal without " General: No scleral icterus. Extraocular Movements: Extraocular movements intact. Conjunctiva/sclera: Conjunctivae normal.   Cardiovascular:      Rate and Rhythm: Normal rate. Pulses: Normal pulses. Pulmonary:      Effort: Pulmonary effort is normal. No respiratory distress. Abdominal:      General: Abdomen is flat. Palpations: Abdomen is soft. Tenderness: There is no abdominal tenderness. Musculoskeletal:      Right hip: Bony tenderness present. Decreased range of motion. Right knee: Swelling, ecchymosis and bony tenderness present. Decreased range of motion. Comments: Shortening and external rotation of the RLE    Skin:     General: Skin is warm and dry. Capillary Refill: Capillary refill takes less than 2 seconds. Neurological:      General: No focal deficit present. Mental Status: She is alert and oriented to person, place, and time. Psychiatric:         Mood and Affect: Mood normal.         Behavior: Behavior normal.        MDM  Number of Diagnoses or Management Options  Closed fracture of right hip, initial encounter [facility])  Diagnosis management comments:     DECISION MAKING:  Jude Solorio is a 81 y.o. female who comes in as above. On arrival to the emergency department, patient's blood pressure is elevated at 206/87, vital signs are otherwise stable. On my examination she does appear uncomfortable secondary to pain. She has tenderness palpation over the right hip, femur and knee. There is limited range of motion at the hip and knee secondary to pain. She has shortening and external rotation of the right lower extremity. There is ecchymosis and edema of the right knee. Neurovascularly intact distally. All other extremities atraumatic and full range of motion. X-ray imaging of the right hip shows a femoral neck fracture. No other acute findings on x-ray of the femur and knee. CT imaging of the head is unremarkable.     I discussed patient's case with on-call orthopedics, Dr. Megan Delgado, who will plan for repair of her fracture. Patient will be transferred to [facility] for treatment of her hip fracture. I discussed results with patient plan for transfer to [facility]. She is in agreement with plan. Of note, patient's blood pressure is much improved after pain medication. Perfect Serve Consult for Admission  7:52 PM    ED Room Number: SER03/03  Patient Name and age:  Tristin Becerra 81 y.o.  female  Working Diagnosis: Closed fracture of right hip, initial encounter ([facility] [address]  (primary encounter diagnosis)    COVID-19 Suspicion:  no  Sepsis present:  no  Reassessment needed: no  Code Status:  Full Code  Readmission: no  Isolation Requirements:  no  Recommended Level of Care:  med/surg  Department:[facility] - [phone]  Other:  81 y.o.  female with a past medical history of hypertension, diabetes, hyperlipidemia  presented for a fall with right hip fracture. Ortho consulted, will make NPO at midnight for repair tomorrow       Amount and/or Complexity of Data Reviewed  Clinical lab tests: reviewed  Tests in the radiology section of CPT®: reviewed           Procedures        LABS:  Recent Results (from the past 24 hour(s))   CBC WITH AUTOMATED DIFF    Collection Time: 12/21/22  7:48 PM   Result Value Ref Range    WBC 14.3 (H) 3.6 - 11.0 K/uL    RBC 4.87 3.80 - 5.20 M/uL    HGB 12.5 11.5 - 16.0 g/dL    HCT 38.5 35.0 - 47.0 %    MCV 79.1 (L) 80.0 - 99.0 FL    MCH 25.7 (L) 26.0 - 34.0 PG    MCHC 32.5 30.0 - 36.5 g/dL    RDW 18.0 (H) 11.5 - 14.5 %    PLATELET 143 084 - 997 K/uL    MPV 9.9 8.9 - 12.9 FL    NRBC 0.0 0  WBC    ABSOLUTE NRBC 0.00 0.00 - 0.01 K/uL    NEUTROPHILS 86 (H) 32 - 75 %    LYMPHOCYTES 7 (L) 12 - 49 %    MONOCYTES 6 5 - 13 %    EOSINOPHILS 0 0 - 7 %    BASOPHILS 1 0 - 1 %    IMMATURE GRANULOCYTES 0 0.0 - 0.5 %    ABS. NEUTROPHILS 12.2 (H) 1.8 - 8.0 K/UL    ABS. LYMPHOCYTES 1.0 0.8 - 3.5 K/UL    ABS.  MONOCYTES 0.9 0.0 - evidence for shadowing stone or mass. No  hydronephrosis.     Left kidney:  Measures 11.1 cm. Cortical thickness is 1.1 cm. Cortical echogenicity  is normal without evidence for shadowing stone or mass. No  hydronephrosis.     Aorta and IVC:  Visualized aorta is non-aneurysmal.  Intrahepatic IVC  is patent.      Impression    IMPRESSION:    Hepatic steatosis.    JOSH RM MD       ASSESSMENT/PLAN:     (Z76.89) Establishing care with new doctor, encounter for  (Z83.79) Family history of Crohn's disease  (K92.1) Blood in stool  (primary encounter diagnosis)  Comment: recent episodes of blood in the stool in the setting of positive family medical history of crohn's disease. the patient reports that his paternal grandfather was diagnosed with crohn's disease and colon cancer in the past.   Plan: I have ordered both diagnostic upper EGD endoscopy and colonoscopy procedures for further evaluation and to screen for IBD with GASTROENTEROLOGY ADULT REF PROCEDURE ONLY         [facility] [phone]; No Provider        Preference, GASTROENTEROLOGY ADULT REF CONSULT         ONLY, CANCELED: GASTROENTEROLOGY ADULT REF         PROCEDURE ONLY [facility] [phone]; No Provider Preference      (G47.33) MOLLY (obstructive sleep apnea)  (R06.83) Snoring  Comment: Regarding the patient's chronic snoring symptoms, the patient complains of chronic snoring symptoms for many years and is interested in an evaluation by the [facility] clinic going forward.   Plan: SLEEP EVALUATION & MANAGEMENT REFERRAL - ADULT         -[facility]         [phone]  (Age 18 and up)      Follow Up Plan:     Patient is instructed to return to Internal Medicine clinic for follow-up visit in 3 months.        Francine Alejandre MD  Internal Medicine  [facility]     1.0 K/UL    ABS. EOSINOPHILS 0.0 0.0 - 0.4 K/UL    ABS. BASOPHILS 0.1 0.0 - 0.1 K/UL    ABS. IMM. GRANS. 0.1 (H) 0.00 - 0.04 K/UL    DF AUTOMATED     METABOLIC PANEL, COMPREHENSIVE    Collection Time: 12/21/22  7:48 PM   Result Value Ref Range    Sodium 141 136 - 145 mmol/L    Potassium 3.0 (L) 3.5 - 5.1 mmol/L    Chloride 102 97 - 108 mmol/L    CO2 25 21 - 32 mmol/L    Anion gap 14 5 - 15 mmol/L    Glucose 154 (H) 65 - 100 mg/dL    BUN 13 6 - 20 MG/DL    Creatinine 1.07 (H) 0.55 - 1.02 MG/DL    BUN/Creatinine ratio 12 12 - 20      eGFR 52 (L) >60 ml/min/1.73m2    Calcium 9.3 8.5 - 10.1 MG/DL    Bilirubin, total 0.5 0.2 - 1.0 MG/DL    ALT (SGPT) 25 12 - 78 U/L    AST (SGOT) 35 15 - 37 U/L    Alk. phosphatase 102 45 - 117 U/L    Protein, total 7.3 6.4 - 8.2 g/dL    Albumin 3.8 3.5 - 5.0 g/dL    Globulin 3.5 2.0 - 4.0 g/dL    A-G Ratio 1.1 1.1 - 2.2     PROTHROMBIN TIME + INR    Collection Time: 12/21/22  7:48 PM   Result Value Ref Range    INR 1.0 0.9 - 1.1      Prothrombin time 9.6 9.0 - 11.1 sec   TYPE & SCREEN    Collection Time: 12/21/22  7:48 PM   Result Value Ref Range    Crossmatch Expiration 12/24/2022,2359     ABO/Rh(D) A POSITIVE     Antibody screen NEG    GLUCOSE, POC    Collection Time: 12/22/22  7:06 AM   Result Value Ref Range    Glucose (POC) 120 (H) 65 - 117 mg/dL    Performed by Shakir Mendiola, POC    Collection Time: 12/22/22 11:29 AM   Result Value Ref Range    Glucose (POC) 142 (H) 65 - 117 mg/dL    Performed by Josesito Boogie  PCT         IMAGING:  XR CHEST PORT   Final Result      No acute process on portable chest. Improved lung aeration since October. XR HIP RT W OR WO PELV 2-3 VWS   Final Result      1. Right femoral neck fracture. 2. No other fracture. 3. Right hip and right knee osteoarthritis. XR FEMUR RT 2 VS   Final Result      1. Right femoral neck fracture. 2. No other fracture. 3. Right hip and right knee osteoarthritis.            XR KNEE RT 3 V   Final Result      1. Right femoral neck fracture. 2. No other fracture. 3. Right hip and right knee osteoarthritis. CT HEAD WO CONT   Final Result      1. No acute intracranial hemorrhage or mass effect. 2. Increased chronic microvascular ischemic disease. 3. Sphenoid sinusitis may be acute or subacute. Medications During Visit:  Medications   atorvastatin (LIPITOR) tablet 40 mg (40 mg Oral Given 12/22/22 1025)   busPIRone (BUSPAR) tablet 10 mg (10 mg Oral Given 12/22/22 1025)   traZODone (DESYREL) tablet 50 mg (has no administration in time range)   . PHARMACY TO SUBSTITUTE PER PROTOCOL (Reordered from: DULOXETINE HCL (CYMBALTA PO)) (has no administration in time range)   losartan/hydroCHLOROthiazide (HYZAAR) 50/12.5 mg ( Oral Given 12/22/22 0517)   hydrALAZINE (APRESOLINE) 20 mg/mL injection 20 mg (has no administration in time range)   oxyCODONE IR (ROXICODONE) tablet 10 mg (has no administration in time range)   HYDROmorphone (DILAUDID) injection 1 mg (1 mg IntraVENous Given 12/22/22 1149)   acetaminophen (TYLENOL) tablet 1,000 mg (1,000 mg Oral Given 12/22/22 0517)   sodium chloride (NS) flush 5-40 mL (10 mL IntraVENous Given 12/22/22 0521)   sodium chloride (NS) flush 5-40 mL (has no administration in time range)   ondansetron (ZOFRAN ODT) tablet 4 mg (has no administration in time range)     Or   ondansetron (ZOFRAN) injection 4 mg (has no administration in time range)   0.9% sodium chloride infusion (75 mL/hr IntraVENous New Bag 12/22/22 0517)   polyethylene glycol (MIRALAX) packet 17 g ( Oral Canceled Entry 12/22/22 0900)   senna-docusate (PERICOLACE) 8.6-50 mg per tablet 1 Tablet ( Oral Canceled Entry 12/22/22 0900)   naloxone (NARCAN) injection 0.4 mg (has no administration in time range)   lactated Ringers infusion (has no administration in time range)   sodium chloride (NS) flush 5-40 mL (has no administration in time range)   sodium chloride (NS) flush 5-40 mL (has no administration in time range)   lidocaine (PF) (XYLOCAINE) 10 mg/mL (1 %) injection 0.1 mL (has no administration in time range)   fentaNYL citrate (PF) injection 50 mcg (has no administration in time range)   midazolam (VERSED) injection 1 mg (has no administration in time range)   acetaminophen (TYLENOL) tablet 650 mg (has no administration in time range)   HYDROcodone-acetaminophen (NORCO) 5-325 mg per tablet 1 Tablet (1 Tablet Oral Given 12/21/22 1819)   ondansetron (ZOFRAN) injection 4 mg (4 mg IntraVENous Given 12/21/22 2002)   morphine injection 4 mg (4 mg IntraVENous Given 12/21/22 2003)   potassium chloride SR (KLOR-CON 10) tablet 40 mEq (40 mEq Oral Given 12/22/22 0518)   potassium chloride 10 mEq in 100 ml IVPB (10 mEq IntraVENous New Bag 12/22/22 0707)         IMPRESSION:  1.  Closed fracture of right hip, initial encounter [facility])        DISPOSITION:  Admitted         Zandra Magana DO

## 2023-06-03 ENCOUNTER — HEALTH MAINTENANCE LETTER (OUTPATIENT)
Age: 33
End: 2023-06-03

## 2023-07-26 ENCOUNTER — VIRTUAL VISIT (OUTPATIENT)
Dept: SLEEP MEDICINE | Facility: CLINIC | Age: 33
End: 2023-07-26
Payer: COMMERCIAL

## 2023-07-26 VITALS — HEIGHT: 70 IN | WEIGHT: 229 LBS | BODY MASS INDEX: 32.78 KG/M2

## 2023-07-26 DIAGNOSIS — G47.33 OSA (OBSTRUCTIVE SLEEP APNEA): Primary | ICD-10-CM

## 2023-07-26 PROCEDURE — 99213 OFFICE O/P EST LOW 20 MIN: CPT | Mod: VID | Performed by: PSYCHIATRY & NEUROLOGY

## 2023-07-26 RX ORDER — ATORVASTATIN CALCIUM 80 MG/1
TABLET, FILM COATED ORAL
COMMUNITY

## 2023-07-26 ASSESSMENT — PAIN SCALES - GENERAL: PAINLEVEL: NO PAIN (0)

## 2023-07-26 ASSESSMENT — SLEEP AND FATIGUE QUESTIONNAIRES
HOW LIKELY ARE YOU TO NOD OFF OR FALL ASLEEP IN A CAR, WHILE STOPPED FOR A FEW MINUTES IN TRAFFIC: WOULD NEVER DOZE
HOW LIKELY ARE YOU TO NOD OFF OR FALL ASLEEP WHILE SITTING AND READING: WOULD NEVER DOZE
HOW LIKELY ARE YOU TO NOD OFF OR FALL ASLEEP WHILE SITTING AND TALKING TO SOMEONE: WOULD NEVER DOZE
HOW LIKELY ARE YOU TO NOD OFF OR FALL ASLEEP WHILE SITTING QUIETLY AFTER LUNCH WITHOUT ALCOHOL: WOULD NEVER DOZE
HOW LIKELY ARE YOU TO NOD OFF OR FALL ASLEEP WHILE SITTING INACTIVE IN A PUBLIC PLACE: WOULD NEVER DOZE
HOW LIKELY ARE YOU TO NOD OFF OR FALL ASLEEP WHILE LYING DOWN TO REST IN THE AFTERNOON WHEN CIRCUMSTANCES PERMIT: WOULD NEVER DOZE
HOW LIKELY ARE YOU TO NOD OFF OR FALL ASLEEP WHILE WATCHING TV: WOULD NEVER DOZE
HOW LIKELY ARE YOU TO NOD OFF OR FALL ASLEEP WHEN YOU ARE A PASSENGER IN A CAR FOR AN HOUR WITHOUT A BREAK: WOULD NEVER DOZE

## 2023-07-26 NOTE — NURSING NOTE
Has patient had flu shot for current/most recent flu season? If so, when? No      Is the patient currently in the state of [place]? YES    Visit mode:VIDEO    If the visit is dropped, the patient can be reconnected by: VIDEO VISIT: Text to cell phone: [phone]    Will anyone else be joining the visit? YES: How would they like to receive their invitation?       How would you like to obtain your AVS? MyChart    Are changes needed to the allergy or medication list? YES: add fish oil to medications    Reason for visit: RECHECK    Naomi Lyn

## 2023-07-26 NOTE — PROGRESS NOTES
Virtual Visit Details    Type of service:  Video Visit     Originating Location (pt. Location): Home  Distant Location (provider location):  On-site  Platform used for Video Visit: AmWell     Brief sleep staff note    Patient returns to discuss CPAP usage.     Patient was diagnosed with sleep disordered breathing in particular with an obstructive component in 2020 with an AHI of 54.  The patient has been treated with autotitrating CPAP (pressures 7 to 16).  The patient indicates treatment is going well.  Uses the device nearly every night for the vast majority of nights.  Indicates that they feel more awake and alert when they use it.      Per the download patient is using the device > 4 hours 100% of nights.  AHI on download is now < 2.  Leak is reasonable but would likely be better with new supplies.     Discussed the improvement in daytime alertness with PAP therapy as well as the cardiovascular benefits.  Patient is highly motivated to continue to use therapy.      Interested in a travel CPAP machine.  I will place an order.     Also discussed his occasional difficulty falling asleep at night.  After some discussion he has a bit of a circadian delay.  Discussed the benefits of bright light in the AM.  He will give it a try.      Orders placed for new supplies and patient will follow up in one year or earlier PRN.      All questions were answered.    It is a great privilege being asked to participate in this patients care.  The patient has been advised on the importance in of never operating operating a motor vehicle while tired or sleepy.        I visited with the patient directly but also extensively reviewed chart and coordinated care. Total time spent in the care of this patient today (Face-to-Face time + chart time, , and coordination of care) was greater than 20 minutes.

## 2023-08-03 NOTE — NURSING NOTE
Return in about 1 year reminder created and to be send via [employer].       Anthony Ann MARCO  [facility]

## 2023-12-11 ENCOUNTER — MYC MEDICAL ADVICE (OUTPATIENT)
Dept: SLEEP MEDICINE | Facility: CLINIC | Age: 33
End: 2023-12-11
Payer: COMMERCIAL

## 2024-03-31 NOTE — PROGRESS NOTES
14  DAY STM VISIT    Diagnostic AHI:  53.5  HST    Subjective measures:   Patient doing well with CPAP and feeling more rested. Patient okay with us calling.      Assessment: Pt meeting objective benchmarks.  Patient meeting subjective benchmarks.     Action plan: pt to have 30 day STM visit.      Device type: Auto-CPAP    PAP settings: CPAP min 5.0 cm  H20       CPAP max 15.0 cm  H20      95th% pressure 12.7 cm  H20     Mask type:  Nasal Mask    Objective measures: 14 day rolling measures      Compliance  85 %      Leak  1.76 lpm  last  upload      AHI 4.77   last  upload      Average number of minutes 333      Objective measure goal  Compliance   Goal >70%  Leak   Goal < 24 lpm  AHI  Goal < 5  Usage  Goal >240        Total time spent on accessing and interpreting remote patient PAP therapy data  10 minutes    Total time spent counseling, coaching  and reviewing PAP therapy data with patient  3 minutes    04955  no  [id number]  no (3 day STM)    
Mother/EMS

## 2024-07-13 ENCOUNTER — HEALTH MAINTENANCE LETTER (OUTPATIENT)
Age: 34
End: 2024-07-13

## 2025-07-19 ENCOUNTER — HEALTH MAINTENANCE LETTER (OUTPATIENT)
Age: 35
End: 2025-07-19

## 2025-07-21 ENCOUNTER — TELEPHONE (OUTPATIENT)
Dept: SLEEP MEDICINE | Facility: CLINIC | Age: 35
End: 2025-07-21
Payer: COMMERCIAL

## 2025-07-21 ASSESSMENT — SLEEP AND FATIGUE QUESTIONNAIRES
HOW LIKELY ARE YOU TO NOD OFF OR FALL ASLEEP WHEN YOU ARE A PASSENGER IN A CAR FOR AN HOUR WITHOUT A BREAK: SLIGHT CHANCE OF DOZING
HOW LIKELY ARE YOU TO NOD OFF OR FALL ASLEEP WHILE WATCHING TV: SLIGHT CHANCE OF DOZING
HOW LIKELY ARE YOU TO NOD OFF OR FALL ASLEEP WHILE SITTING INACTIVE IN A PUBLIC PLACE: WOULD NEVER DOZE
HOW LIKELY ARE YOU TO NOD OFF OR FALL ASLEEP WHILE SITTING AND TALKING TO SOMEONE: WOULD NEVER DOZE
HOW LIKELY ARE YOU TO NOD OFF OR FALL ASLEEP WHILE SITTING AND READING: MODERATE CHANCE OF DOZING
HOW LIKELY ARE YOU TO NOD OFF OR FALL ASLEEP WHILE LYING DOWN TO REST IN THE AFTERNOON WHEN CIRCUMSTANCES PERMIT: MODERATE CHANCE OF DOZING
HOW LIKELY ARE YOU TO NOD OFF OR FALL ASLEEP IN A CAR, WHILE STOPPED FOR A FEW MINUTES IN TRAFFIC: WOULD NEVER DOZE
HOW LIKELY ARE YOU TO NOD OFF OR FALL ASLEEP WHILE SITTING QUIETLY AFTER LUNCH WITHOUT ALCOHOL: WOULD NEVER DOZE

## 2025-07-22 ENCOUNTER — VIRTUAL VISIT (OUTPATIENT)
Dept: SLEEP MEDICINE | Facility: CLINIC | Age: 35
End: 2025-07-22
Payer: COMMERCIAL

## 2025-07-22 VITALS — WEIGHT: 210 LBS | HEIGHT: 70 IN | BODY MASS INDEX: 30.06 KG/M2

## 2025-07-22 DIAGNOSIS — G47.33 OSA (OBSTRUCTIVE SLEEP APNEA): Primary | ICD-10-CM

## 2025-07-22 PROCEDURE — 98004 SYNCH AUDIO-VIDEO EST SF 10: CPT | Performed by: NURSE PRACTITIONER

## 2025-07-22 PROCEDURE — 1126F AMNT PAIN NOTED NONE PRSNT: CPT | Performed by: NURSE PRACTITIONER

## 2025-07-22 ASSESSMENT — PAIN SCALES - GENERAL: PAINLEVEL_OUTOF10: NO PAIN (0)

## 2025-07-22 NOTE — NURSING NOTE
Current patient location: [address] [place] [id number]    Is the patient currently in the state of [place]? YES    Visit mode: VIDEO    If the visit is dropped, the patient can be reconnected by:VIDEO VISIT: Text to cell phone:   Telephone Information:   Mobile [phone]       Will anyone else be joining the visit? NO  (If patient encounters technical issues they should call [phone]    Are changes needed to the allergy or medication list? No    Are refills needed on medications prescribed by this physician? NO    Rooming Documentation:  Questionnaire(s) completed    Reason for visit: RECHECK    Buster KEMP

## 2025-07-22 NOTE — PROGRESS NOTES
Virtual Visit Details    Type of service:  Video Visit     Originating Location (pt. Location): Other in car in [place]    Distant Location (provider location):  Off-site  Platform used for Video Visit: Isaac      Sleep Apnea - Follow-up Visit:    Impression/Plan:  1. MOLLY (obstructive sleep apnea) (Primary)  - Comprehensive DME     Severe Sleep apnea. Tolerating PAP well. Daytime symptoms are improved.  He continues to use and benefit from PAP therapy.  The patient indicates that he has had his machine for over 5 years and would like a prescription for replacement device at this time.  Overall, he is doing very well on PAP therapy and has no other immediate concerns.  Of note, the patient reports that he has lost approximately total of 60 pounds overall and 44 pounds since his last sleep study.    A review of his APAP download data over the last 30 days shows excellent use and compliance and severe MOLLY that is well-controlled on current pressure settings.    Continue current plan of care.  A comprehensive DME order was placed for new/replacement APAP device, new mask and supplies and sent to [facility], today.  We also briefly discussed usual use/compliance requirements associated with new/replacement PAP device therapy, if applicable.    Mason Allen will follow up in approximately 3 months to review APAP download data and use/compliance, if applicable, otherwise patient may follow-up in approximately 1-2 years, sooner if needed.    19 minutes spent with patient, all of which were spent face-to-face counseling, consulting, chart review/documentation, and coordinating plan of care on the date of the encounter.      GAMALIEL Hurd CNP  Sleep Medicine      CC:  Francine Alejandre,         History of Present Illness:  Chief Complaint   Patient presents with    RECHECK       Mason Allen is a 35-year-old male with a PMH significant for seasonal allergic rhinitis, ADHD, generalized anxiety  disorder, and obesity who presents for 2-year follow-up of their severe sleep apnea, managed with CPAP.  He was last seen in a virtual visit on 7/26/2023 with Dr. Rl Gonzales in follow-up for MOLLY on CPAP therapy. He has a travel APAP device for travel use.    Previous Study Results: [facility]  Date: 1/21/2020.  Weight 254 pounds.  Analysis Time:  421.3 minutes     Respiration:   Sleep Associated Hypoxemia: sustained hypoxemia was present. Baseline oxygen saturation was 95.9%.  Time with saturation less than or equal to 88% was 12.2 minutes. The lowest oxygen saturation was 82%.   Snoring: Snoring was present.  Respiratory events: The home study revealed a presence of 199 obstructive apneas and 14 mixed and central apneas. There were 163 hypopneas resulting in a combined apnea/hypopnea index [AHI] of 53.5 events per hour.  AHI was 88.8 per hour supine, 1.9 per hour prone, 45.3 per hour on left side, and 53 per hour on right side.   Pattern: Excluding events noted above, respiratory rate and pattern was Normal.     Position: Percent of time spent: supine - 27.9%, prone - 14.7%, on left - 22.3%, on right - 34.5%.     Heart Rate: By pulse oximetry normal rate was noted.      Assessment:   Severe obstructive sleep apnea.  Sleep associated hypoxemia was present.      DME- Cpap supplies [facility]    Do you use a CPAP Machine at home: (Patient-Rptd) Yes  Overall, on a scale of 0-10 how would you rate your CPAP (0 poor, 10 great): (Patient-Rptd) 10    What type of mask do you use:    Is your mask comfortable: (Patient-Rptd) Yes  If not, why:      Is your mask leaking: (Patient-Rptd) No  If yes, where do you feel it:    How many night per week does the mask leak (0-7):      Do you notice snoring with mask on: (Patient-Rptd) No  Do you notice gasping arousals with mask on: (Patient-Rptd) No  Are you having significant oral or nasal dryness: (Patient-Rptd) Yes  Is the pressure setting comfortable: (Patient-Rptd) Yes  If not, why:       What is your typical bedtime: (Patient-Rptd) 11:00  How long does it take you to go to sleep on PAP therapy: (Patient-Rptd) 30 minutes  What time do you typically get out of bed for the day: (Patient-Rptd) 8:00  How many hours on average per night are you using PAP therapy: (Patient-Rptd) 8?  How many hours are you sleeping per night: (Patient-Rptd) 8?  Do you feel well rested in the morning: (Patient-Rptd) No      ResMed AirSense 10  Auto-PAP 7.0 - 16.0 cmH2O 30 day usage data:  6/22/25 - 7/21/25  93% of days with > 4 hours of use. 1/30 days with no use.   Average use 444 minutes per day.   95%ile Leak 6.48 L/min.   CPAP 95% pressure 9.4 cm.   AHI 1.78 events per hour.        EPWORTH SLEEPINESS SCALE         7/21/2025    10:56 AM    H[place] Sleepiness Scale ( PONCHO Nunn  0785-2046<br>ESS - [place]/English - Final version - 21 Nov 07 - [facility] [place].)   Sitting and reading Moderate chance of dozing   Watching TV Slight chance of dozing   Sitting, inactive in a public place (e.g. a theatre or a meeting) Would never doze   As a passenger in a car for an hour without a break Slight chance of dozing   Lying down to rest in the afternoon when circumstances permit Moderate chance of dozing   Sitting and talking to someone Would never doze   Sitting quietly after a lunch without alcohol Would never doze   In a car, while stopped for a few minutes in traffic Would never doze   Hatteras Score (MC) 6   Hatteras Score (Sleep) 6        Patient-reported       INSOMNIA SEVERITY INDEX (HUYEN)          7/21/2025    10:54 AM   Insomnia Severity Index (HUYEN)   Difficulty falling asleep 2   Difficulty staying asleep 1   Problems waking up too early 1   How SATISFIED/DISSATISFIED are you with your CURRENT sleep pattern? 1   How NOTICEABLE to others do you think your sleep problem is in terms of impairing the quality of your life? 1   How WORRIED/DISTRESSED are you about your current sleep problem? 1   To what extent do you  "consider your sleep problem to INTERFERE with your daily functioning (e.g. daytime fatigue, mood, ability to function at work/daily chores, concentration, memory, mood, etc.) CURRENTLY? 2   HUYEN Total Score 9        Patient-reported       Guidelines for Scoring/Interpretation:  Total score categories:  0-7 = No clinically significant insomnia   8-14 = Subthreshold insomnia   15-21 = Clinical insomnia (moderate severity)  22-28 = Clinical insomnia (severe)  Used via courtesy of www.[employer].va.gov with permission from Jim Boone PhD., [facility]      Past medical/surgical history, family history, social history, medications and allergies were reviewed.        Problem List:  Patient Active Problem List    Diagnosis Date Noted    Morbid obesity (H) 04/12/2021     Priority: Medium    MOLLY (obstructive sleep apnea) 01/23/2020     Priority: Medium     Severe MOLLY      Snoring 10/03/2019     Priority: Medium    Attention deficit disorder with hyperactivity(314.01) 07/05/2016     Priority: Medium    JONATHON (generalized anxiety disorder) 07/05/2016     Priority: Medium    Seasonal allergic rhinitis 07/05/2016     Priority: Medium      Current Outpatient Medications   Medication Sig Dispense Refill    atorvastatin (LIPITOR) 80 MG tablet TAKE 1 TABLET (80 MG) BY MOUTH IN THE MORNING       No current facility-administered medications for this visit.     Ht 1.778 m (5' 10\")   Wt 95.3 kg (210 lb)   BMI 30.13 kg/m        This note was written with the assistance of the Dragon voice-dictation technology software. The final document, although reviewed, may contain errors. For corrections, please contact the office.    "

## 2025-07-22 NOTE — PATIENT INSTRUCTIONS
MY INFORMATION ON SLEEP APNEA-  Mason Allen    DOCTOR : GAMALIEL Hurd CNP  SLEEP CENTER :      MY CONTACT NUMBER:   [facility]  [phone]  [facility]   [phone]  [facility]   [phone]      [facility]  [phone]    DME:  [facility] [place] [address], [address]  [place] [address]  Phone: [phone]    Hours:  Mon - Fri: 8:00 a.m. - 4:30 p.m.  Sat: Closed  Sun: Closed    Weight Loss:    Weight loss decreases severity of sleep apnea in most people with obesity. For those with mild obesity who have developed snoring with weight gain, even 15-30 pound weight loss can improve and occasionally eliminate sleep apnea.  Structured and life-long dietary and health habits are necessary to lose weight and keep healthier weight levels.     Though there are significant health benefits from weight loss, long-term weight loss is very difficult to achieve- studies show success with dietary management in less than 10% of people. In addition, substantial weight loss may require years of dietary control and may be difficult if patients have severe obesity. In these cases, surgical management may be considered.    If you are interested in methods for weight loss, you should review the options discussed at the National Institutes of Health patient information sites:     http://win.niddk.nih.gov/publications/index.htm  http:/www.health.nih.gov/topic/WeightLossDieting    Bariatric programs offer counseling in all methods of weight loss:    Http:/www.[facility].org/Specialties/WeightLossSurgeryandMedicalMgmt/htm    Your BMI is Body mass index is 30.13 kg/m .    Body mass index (BMI) is one way to tell whether you are at a healthy weight, overweight, or obese. It measures your weight in relation to your height.  A BMI of 18.5 to 24.9 is in the healthy range. A person with a  BMI of 25 to 29.9 is considered overweight, and someone with a BMI of 30 or greater is considered obese.  Another way to find out if you are at risk for health problems caused by overweight and obesity is to measure your waist. If you are a woman and your waist is more than 35 inches, or if you are a man and your waist is more than 40 inches, your risk of disease may be higher.  More than two-thirds of American adults are considered overweight or obese. Being overweight or obese increases the risk for further weight gain.  Excess weight may lead to heart disease and diabetes. Creating and following plans for healthy eating and physical activity may help you improve your health.    Methods for maintaining or losing weight.    Weight control is part of healthy lifestyle and includes exercise, emotional health, and healthy eating habits.  Careful eating habits lifelong is the mainstay of weight control.  Though there are significant health benefits from weight loss, long-term weight loss with diet alone may be very difficult to achieve- studies show long-term success with dietary management in less than 10% of people. Attaining a healthy weight may be especially difficult to achieve in those with severe obesity. In some cases, medications, devices and surgical management might be considered.    What can you do?    If you are overweight or obese and are interested in methods for weight loss, you should discuss this with your provider. In addition, we recommend that you review healthy life styles and methods for weight loss available through the National Institutes of Health patient information sites:     http://win.niddk.nih.gov/publications/index.htm

## 2025-08-29 ENCOUNTER — OFFICE VISIT (OUTPATIENT)
Dept: FAMILY MEDICINE | Facility: CLINIC | Age: 35
End: 2025-08-29
Payer: COMMERCIAL

## 2025-08-29 VITALS
DIASTOLIC BLOOD PRESSURE: 80 MMHG | BODY MASS INDEX: 30.78 KG/M2 | RESPIRATION RATE: 18 BRPM | SYSTOLIC BLOOD PRESSURE: 120 MMHG | OXYGEN SATURATION: 96 % | HEIGHT: 70 IN | WEIGHT: 215 LBS | TEMPERATURE: 97.2 F | HEART RATE: 96 BPM

## 2025-08-29 DIAGNOSIS — Z13.1 SCREENING FOR DIABETES MELLITUS: ICD-10-CM

## 2025-08-29 DIAGNOSIS — Z11.4 SCREENING FOR HIV (HUMAN IMMUNODEFICIENCY VIRUS): ICD-10-CM

## 2025-08-29 DIAGNOSIS — Z00.00 ROUTINE HISTORY AND PHYSICAL EXAMINATION OF ADULT: Primary | ICD-10-CM

## 2025-08-29 DIAGNOSIS — Z13.220 LIPID SCREENING: ICD-10-CM

## 2025-08-29 DIAGNOSIS — R79.89 ELEVATED LFTS: ICD-10-CM

## 2025-08-29 LAB
ALBUMIN SERPL BCG-MCNC: 4.7 G/DL (ref 3.5–5.2)
ALP SERPL-CCNC: 58 U/L (ref 40–150)
ALT SERPL W P-5'-P-CCNC: 31 U/L (ref 0–70)
ANION GAP SERPL CALCULATED.3IONS-SCNC: 11 MMOL/L (ref 7–15)
AST SERPL W P-5'-P-CCNC: 24 U/L (ref 0–45)
BASOPHILS # BLD AUTO: 0.04 10E3/UL (ref 0–0.2)
BASOPHILS NFR BLD AUTO: 0.7 %
BILIRUB SERPL-MCNC: 0.8 MG/DL
BILIRUBIN DIRECT (ROCHE PRO & PURE): 0.27 MG/DL (ref 0–0.45)
BUN SERPL-MCNC: 10.1 MG/DL (ref 6–20)
CALCIUM SERPL-MCNC: 10.1 MG/DL (ref 8.8–10.4)
CHLORIDE SERPL-SCNC: 103 MMOL/L (ref 98–107)
CHOLEST SERPL-MCNC: 241 MG/DL
CREAT SERPL-MCNC: 0.94 MG/DL (ref 0.67–1.17)
EGFRCR SERPLBLD CKD-EPI 2021: >90 ML/MIN/1.73M2
EOSINOPHIL # BLD AUTO: 0.13 10E3/UL (ref 0–0.7)
EOSINOPHIL NFR BLD AUTO: 2.3 %
ERYTHROCYTE [DISTWIDTH] IN BLOOD BY AUTOMATED COUNT: 12.3 % (ref 10–15)
EST. AVERAGE GLUCOSE BLD GHB EST-MCNC: 88 MG/DL
FASTING STATUS PATIENT QL REPORTED: YES
FASTING STATUS PATIENT QL REPORTED: YES
GLUCOSE SERPL-MCNC: 101 MG/DL (ref 70–99)
HBA1C MFR BLD: 4.7 % (ref 0–5.6)
HCO3 SERPL-SCNC: 27 MMOL/L (ref 22–29)
HCT VFR BLD AUTO: 43.5 % (ref 40–53)
HDLC SERPL-MCNC: 52 MG/DL
HGB BLD-MCNC: 14.4 G/DL (ref 13.3–17.7)
HIV 1+2 AB+HIV1 P24 AG SERPL QL IA: NONREACTIVE
IMM GRANULOCYTES # BLD: <0.04 10E3/UL
IMM GRANULOCYTES NFR BLD: 0.2 %
LDLC SERPL CALC-MCNC: 159 MG/DL
LYMPHOCYTES # BLD AUTO: 1.24 10E3/UL (ref 0.8–5.3)
LYMPHOCYTES NFR BLD AUTO: 22.1 %
MCH RBC QN AUTO: 28.5 PG (ref 26.5–33)
MCHC RBC AUTO-ENTMCNC: 33.1 G/DL (ref 31.5–36.5)
MCV RBC AUTO: 86.1 FL (ref 78–100)
MONOCYTES # BLD AUTO: 0.45 10E3/UL (ref 0–1.3)
MONOCYTES NFR BLD AUTO: 8 %
NEUTROPHILS # BLD AUTO: 3.74 10E3/UL (ref 1.6–8.3)
NEUTROPHILS NFR BLD AUTO: 66.7 %
NONHDLC SERPL-MCNC: 189 MG/DL
PLATELET # BLD AUTO: 264 10E3/UL (ref 150–450)
POTASSIUM SERPL-SCNC: 4.7 MMOL/L (ref 3.4–5.3)
PROT SERPL-MCNC: 7.5 G/DL (ref 6.4–8.3)
RBC # BLD AUTO: 5.05 10E6/UL (ref 4.4–5.9)
SODIUM SERPL-SCNC: 141 MMOL/L (ref 135–145)
TRIGL SERPL-MCNC: 149 MG/DL
WBC # BLD AUTO: 5.61 10E3/UL (ref 4–11)

## 2025-08-29 PROCEDURE — 3074F SYST BP LT 130 MM HG: CPT | Performed by: INTERNAL MEDICINE

## 2025-08-29 PROCEDURE — 36415 COLL VENOUS BLD VENIPUNCTURE: CPT | Performed by: INTERNAL MEDICINE

## 2025-08-29 PROCEDURE — 85025 COMPLETE CBC W/AUTO DIFF WBC: CPT | Performed by: INTERNAL MEDICINE

## 2025-08-29 PROCEDURE — 3050F LDL-C >= 130 MG/DL: CPT | Performed by: INTERNAL MEDICINE

## 2025-08-29 PROCEDURE — 80053 COMPREHEN METABOLIC PANEL: CPT | Performed by: INTERNAL MEDICINE

## 2025-08-29 PROCEDURE — 99385 PREV VISIT NEW AGE 18-39: CPT | Performed by: INTERNAL MEDICINE

## 2025-08-29 PROCEDURE — 87389 HIV-1 AG W/HIV-1&-2 AB AG IA: CPT | Performed by: INTERNAL MEDICINE

## 2025-08-29 PROCEDURE — 83036 HEMOGLOBIN GLYCOSYLATED A1C: CPT | Performed by: INTERNAL MEDICINE

## 2025-08-29 PROCEDURE — 1125F AMNT PAIN NOTED PAIN PRSNT: CPT | Performed by: INTERNAL MEDICINE

## 2025-08-29 PROCEDURE — 82248 BILIRUBIN DIRECT: CPT | Performed by: INTERNAL MEDICINE

## 2025-08-29 PROCEDURE — 3044F HG A1C LEVEL LT 7.0%: CPT | Performed by: INTERNAL MEDICINE

## 2025-08-29 PROCEDURE — 80061 LIPID PANEL: CPT | Performed by: INTERNAL MEDICINE

## 2025-08-29 PROCEDURE — 3079F DIAST BP 80-89 MM HG: CPT | Performed by: INTERNAL MEDICINE

## 2025-08-29 SDOH — HEALTH STABILITY: PHYSICAL HEALTH: ON AVERAGE, HOW MANY MINUTES DO YOU ENGAGE IN EXERCISE AT THIS LEVEL?: 90 MIN

## 2025-08-29 SDOH — HEALTH STABILITY: PHYSICAL HEALTH: ON AVERAGE, HOW MANY DAYS PER WEEK DO YOU ENGAGE IN MODERATE TO STRENUOUS EXERCISE (LIKE A BRISK WALK)?: 3 DAYS

## 2025-08-29 ASSESSMENT — PAIN SCALES - GENERAL: PAINLEVEL_OUTOF10: MILD PAIN (3)

## (undated) RX ORDER — ONDANSETRON 2 MG/ML
INJECTION INTRAMUSCULAR; INTRAVENOUS
Status: DISPENSED
Start: 2019-10-17

## (undated) RX ORDER — FENTANYL CITRATE 50 UG/ML
INJECTION, SOLUTION INTRAMUSCULAR; INTRAVENOUS
Status: DISPENSED
Start: 2019-10-17